# Patient Record
Sex: MALE | URBAN - METROPOLITAN AREA
[De-identification: names, ages, dates, MRNs, and addresses within clinical notes are randomized per-mention and may not be internally consistent; named-entity substitution may affect disease eponyms.]

---

## 2018-02-13 ENCOUNTER — INPATIENT (INPATIENT)
Age: 68
LOS: 6 days | Discharge: HOME | End: 2018-02-20
Attending: INTERNAL MEDICINE | Admitting: INTERNAL MEDICINE
Payer: MEDICARE

## 2018-02-13 VITALS
HEART RATE: 18 BPM | RESPIRATION RATE: 18 BRPM | SYSTOLIC BLOOD PRESSURE: 123 MMHG | DIASTOLIC BLOOD PRESSURE: 62 MMHG | OXYGEN SATURATION: 100 % | TEMPERATURE: 98 F

## 2018-02-13 DIAGNOSIS — R07.9 CHEST PAIN, UNSPECIFIED: ICD-10-CM

## 2018-02-13 LAB
ALBUMIN SERPL ELPH-MCNC: 4.4 G/DL — SIGNIFICANT CHANGE UP (ref 3.3–5)
ALP SERPL-CCNC: 98 U/L — SIGNIFICANT CHANGE UP (ref 40–120)
ALT FLD-CCNC: 11 U/L — SIGNIFICANT CHANGE UP (ref 4–41)
AST SERPL-CCNC: 23 U/L — SIGNIFICANT CHANGE UP (ref 4–40)
BASOPHILS # BLD AUTO: 0.02 K/UL — SIGNIFICANT CHANGE UP (ref 0–0.2)
BASOPHILS NFR BLD AUTO: 0.3 % — SIGNIFICANT CHANGE UP (ref 0–2)
BILIRUB SERPL-MCNC: 0.3 MG/DL — SIGNIFICANT CHANGE UP (ref 0.2–1.2)
BUN SERPL-MCNC: 131 MG/DL — HIGH (ref 7–23)
CALCIUM SERPL-MCNC: 9.5 MG/DL — SIGNIFICANT CHANGE UP (ref 8.4–10.5)
CHLORIDE SERPL-SCNC: 90 MMOL/L — LOW (ref 98–107)
CK MB BLD-MCNC: 2.51 NG/ML — SIGNIFICANT CHANGE UP (ref 1–6.6)
CK MB BLD-MCNC: SIGNIFICANT CHANGE UP (ref 0–2.5)
CK SERPL-CCNC: 102 U/L — SIGNIFICANT CHANGE UP (ref 30–200)
CO2 SERPL-SCNC: 23 MMOL/L — SIGNIFICANT CHANGE UP (ref 22–31)
CREAT SERPL-MCNC: 2.67 MG/DL — HIGH (ref 0.5–1.3)
EOSINOPHIL # BLD AUTO: 0.32 K/UL — SIGNIFICANT CHANGE UP (ref 0–0.5)
EOSINOPHIL NFR BLD AUTO: 4.3 % — SIGNIFICANT CHANGE UP (ref 0–6)
GLUCOSE SERPL-MCNC: 120 MG/DL — HIGH (ref 70–99)
HCT VFR BLD CALC: 28.9 % — LOW (ref 39–50)
HGB BLD-MCNC: 9.7 G/DL — LOW (ref 13–17)
IMM GRANULOCYTES # BLD AUTO: 0.04 # — SIGNIFICANT CHANGE UP
IMM GRANULOCYTES NFR BLD AUTO: 0.5 % — SIGNIFICANT CHANGE UP (ref 0–1.5)
LYMPHOCYTES # BLD AUTO: 0.77 K/UL — LOW (ref 1–3.3)
LYMPHOCYTES # BLD AUTO: 10.4 % — LOW (ref 13–44)
MCHC RBC-ENTMCNC: 32.1 PG — SIGNIFICANT CHANGE UP (ref 27–34)
MCHC RBC-ENTMCNC: 33.6 % — SIGNIFICANT CHANGE UP (ref 32–36)
MCV RBC AUTO: 95.7 FL — SIGNIFICANT CHANGE UP (ref 80–100)
MONOCYTES # BLD AUTO: 0.62 K/UL — SIGNIFICANT CHANGE UP (ref 0–0.9)
MONOCYTES NFR BLD AUTO: 8.3 % — SIGNIFICANT CHANGE UP (ref 2–14)
NEUTROPHILS # BLD AUTO: 5.66 K/UL — SIGNIFICANT CHANGE UP (ref 1.8–7.4)
NEUTROPHILS NFR BLD AUTO: 76.2 % — SIGNIFICANT CHANGE UP (ref 43–77)
NRBC # FLD: 0 — SIGNIFICANT CHANGE UP
PLATELET # BLD AUTO: 182 K/UL — SIGNIFICANT CHANGE UP (ref 150–400)
PMV BLD: 9.9 FL — SIGNIFICANT CHANGE UP (ref 7–13)
POTASSIUM SERPL-MCNC: 4.4 MMOL/L — SIGNIFICANT CHANGE UP (ref 3.5–5.3)
POTASSIUM SERPL-SCNC: 4.4 MMOL/L — SIGNIFICANT CHANGE UP (ref 3.5–5.3)
PROT SERPL-MCNC: 7.9 G/DL — SIGNIFICANT CHANGE UP (ref 6–8.3)
RBC # BLD: 3.02 M/UL — LOW (ref 4.2–5.8)
RBC # FLD: 14.7 % — HIGH (ref 10.3–14.5)
SODIUM SERPL-SCNC: 135 MMOL/L — SIGNIFICANT CHANGE UP (ref 135–145)
TROPONIN T SERPL-MCNC: < 0.06 NG/ML — SIGNIFICANT CHANGE UP (ref 0–0.06)
WBC # BLD: 7.43 K/UL — SIGNIFICANT CHANGE UP (ref 3.8–10.5)
WBC # FLD AUTO: 7.43 K/UL — SIGNIFICANT CHANGE UP (ref 3.8–10.5)

## 2018-02-13 PROCEDURE — 71046 X-RAY EXAM CHEST 2 VIEWS: CPT | Mod: 26

## 2018-02-13 RX ORDER — ASPIRIN/CALCIUM CARB/MAGNESIUM 324 MG
162 TABLET ORAL ONCE
Qty: 0 | Refills: 0 | Status: COMPLETED | OUTPATIENT
Start: 2018-02-13 | End: 2018-02-13

## 2018-02-13 RX ADMIN — Medication 162 MILLIGRAM(S): at 21:03

## 2018-02-13 NOTE — ED ADULT NURSE NOTE - OBJECTIVE STATEMENT
Received pt to spot 4 A&ox4 c/o sent by PCP r/t EKG changes a/w midsternal CP nonradiating and mild lethargy since this am. Pt reports had stent placed 4 yrs ago was on blood thinners, has not been in years, takes daily 81mg ASA. respirations noted to be even and unlabored on assessment, denies other PMH, NAD noted, IV placed, labs sent, VS as documented, will reassess.

## 2018-02-13 NOTE — ED PROVIDER NOTE - ATTENDING CONTRIBUTION TO CARE
DR. GILL, ATTENDING MD-  I performed a face to face bedside interview with patient regarding history of present illness, review of symptoms and past medical history. I completed an independent physical exam.  I have discussed patient's plan of care with the resident.   Documentation as above in the note.    Mode: h/o CAD, CKD, obesity, c/o generalized weakness x several days, followed by left cp since yesterday.  CP dull, 5/10 sev, no exac/allev factors, no radiation, no assoc dyspnea/diaphoresis/nausea.  currently no CP.  On my exam: well appearing comfortable, heart rrr, lungs clear, abd soft, legs no signif ESTEFANI.  a/p: cp, r/o acs

## 2018-02-13 NOTE — ED ADULT NURSE NOTE - CHIEF COMPLAINT QUOTE
pt BIBA to [facility] and tranffered to [facility], pt was seen at the clinic today for chest pain.  pt has PMH: MI with cardiac stents.

## 2018-02-13 NOTE — ED PROVIDER NOTE - OBJECTIVE STATEMENT
67M h/o HTN, HLD, CKD, CAD s/p stent p/w non-exertional, non-radiating L sided CP since yesterday morning, originally intermittent but constant today, went to [facility] ans was sent to ED. Endorses weakness and decreased PO intake x 2-3 days. Denies nausea, vomiting, leg pain, leg swelling, injuries, SOB.

## 2018-02-13 NOTE — ED PROVIDER NOTE - PMH
AAA (abdominal aortic aneurysm)    Chronic back pain    CKD (chronic kidney disease)    COPD bronchitis    Hyperlipidemia    Hypertension    Sleep apnea

## 2018-02-13 NOTE — ED ADULT TRIAGE NOTE - CHIEF COMPLAINT QUOTE
pt BIBA to [facility] and tranffered to [facility], pt was seen at the clinic today for chest pain.  pt has PMH: MI with cardiac stents.

## 2018-02-14 DIAGNOSIS — R60.0 LOCALIZED EDEMA: ICD-10-CM

## 2018-02-14 DIAGNOSIS — N18.4 CHRONIC KIDNEY DISEASE, STAGE 4 (SEVERE): ICD-10-CM

## 2018-02-14 DIAGNOSIS — R07.9 CHEST PAIN, UNSPECIFIED: ICD-10-CM

## 2018-02-14 DIAGNOSIS — E78.5 HYPERLIPIDEMIA, UNSPECIFIED: ICD-10-CM

## 2018-02-14 DIAGNOSIS — N17.9 ACUTE KIDNEY FAILURE, UNSPECIFIED: ICD-10-CM

## 2018-02-14 DIAGNOSIS — I12.9 HYPERTENSIVE CHRONIC KIDNEY DISEASE WITH STAGE 1 THROUGH STAGE 4 CHRONIC KIDNEY DISEASE, OR UNSPECIFIED CHRONIC KIDNEY DISEASE: ICD-10-CM

## 2018-02-14 DIAGNOSIS — I10 ESSENTIAL (PRIMARY) HYPERTENSION: ICD-10-CM

## 2018-02-14 DIAGNOSIS — N25.81 SECONDARY HYPERPARATHYROIDISM OF RENAL ORIGIN: ICD-10-CM

## 2018-02-14 DIAGNOSIS — D63.1 ANEMIA IN CHRONIC KIDNEY DISEASE: ICD-10-CM

## 2018-02-14 DIAGNOSIS — M54.9 DORSALGIA, UNSPECIFIED: ICD-10-CM

## 2018-02-14 DIAGNOSIS — Z29.9 ENCOUNTER FOR PROPHYLACTIC MEASURES, UNSPECIFIED: ICD-10-CM

## 2018-02-14 DIAGNOSIS — N18.9 CHRONIC KIDNEY DISEASE, UNSPECIFIED: ICD-10-CM

## 2018-02-14 LAB
BASOPHILS # BLD AUTO: 0.02 K/UL — SIGNIFICANT CHANGE UP (ref 0–0.2)
BASOPHILS NFR BLD AUTO: 0.3 % — SIGNIFICANT CHANGE UP (ref 0–2)
BUN SERPL-MCNC: 133 MG/DL — HIGH (ref 7–23)
CALCIUM SERPL-MCNC: 9.6 MG/DL — SIGNIFICANT CHANGE UP (ref 8.4–10.5)
CHLORIDE SERPL-SCNC: 91 MMOL/L — LOW (ref 98–107)
CHOLEST SERPL-MCNC: 96 MG/DL — LOW (ref 120–199)
CK MB BLD-MCNC: 2.22 NG/ML — SIGNIFICANT CHANGE UP (ref 1–6.6)
CK MB BLD-MCNC: 2.45 NG/ML — SIGNIFICANT CHANGE UP (ref 1–6.6)
CK SERPL-CCNC: 58 U/L — SIGNIFICANT CHANGE UP (ref 30–200)
CK SERPL-CCNC: 66 U/L — SIGNIFICANT CHANGE UP (ref 30–200)
CO2 SERPL-SCNC: 24 MMOL/L — SIGNIFICANT CHANGE UP (ref 22–31)
CREAT SERPL-MCNC: 2.47 MG/DL — HIGH (ref 0.5–1.3)
EOSINOPHIL # BLD AUTO: 0.32 K/UL — SIGNIFICANT CHANGE UP (ref 0–0.5)
EOSINOPHIL NFR BLD AUTO: 5.1 % — SIGNIFICANT CHANGE UP (ref 0–6)
GLUCOSE SERPL-MCNC: 93 MG/DL — SIGNIFICANT CHANGE UP (ref 70–99)
HBA1C BLD-MCNC: 5.3 % — SIGNIFICANT CHANGE UP (ref 4–5.6)
HCT VFR BLD CALC: 27.6 % — LOW (ref 39–50)
HDLC SERPL-MCNC: 29 MG/DL — LOW (ref 35–55)
HGB BLD-MCNC: 9.3 G/DL — LOW (ref 13–17)
IMM GRANULOCYTES # BLD AUTO: 0.03 # — SIGNIFICANT CHANGE UP
IMM GRANULOCYTES NFR BLD AUTO: 0.5 % — SIGNIFICANT CHANGE UP (ref 0–1.5)
LIPID PNL WITH DIRECT LDL SERPL: 51 MG/DL — SIGNIFICANT CHANGE UP
LYMPHOCYTES # BLD AUTO: 0.84 K/UL — LOW (ref 1–3.3)
LYMPHOCYTES # BLD AUTO: 13.3 % — SIGNIFICANT CHANGE UP (ref 13–44)
MAGNESIUM SERPL-MCNC: 1.8 MG/DL — SIGNIFICANT CHANGE UP (ref 1.6–2.6)
MCHC RBC-ENTMCNC: 32.3 PG — SIGNIFICANT CHANGE UP (ref 27–34)
MCHC RBC-ENTMCNC: 33.7 % — SIGNIFICANT CHANGE UP (ref 32–36)
MCV RBC AUTO: 95.8 FL — SIGNIFICANT CHANGE UP (ref 80–100)
MONOCYTES # BLD AUTO: 0.57 K/UL — SIGNIFICANT CHANGE UP (ref 0–0.9)
MONOCYTES NFR BLD AUTO: 9 % — SIGNIFICANT CHANGE UP (ref 2–14)
NEUTROPHILS # BLD AUTO: 4.53 K/UL — SIGNIFICANT CHANGE UP (ref 1.8–7.4)
NEUTROPHILS NFR BLD AUTO: 71.8 % — SIGNIFICANT CHANGE UP (ref 43–77)
NRBC # FLD: 0 — SIGNIFICANT CHANGE UP
PHOSPHATE SERPL-MCNC: 4.6 MG/DL — HIGH (ref 2.5–4.5)
PLATELET # BLD AUTO: 195 K/UL — SIGNIFICANT CHANGE UP (ref 150–400)
PMV BLD: 10.4 FL — SIGNIFICANT CHANGE UP (ref 7–13)
POTASSIUM SERPL-MCNC: 3.1 MMOL/L — LOW (ref 3.5–5.3)
POTASSIUM SERPL-SCNC: 3.1 MMOL/L — LOW (ref 3.5–5.3)
RBC # BLD: 2.88 M/UL — LOW (ref 4.2–5.8)
RBC # FLD: 14.6 % — HIGH (ref 10.3–14.5)
SODIUM SERPL-SCNC: 139 MMOL/L — SIGNIFICANT CHANGE UP (ref 135–145)
TRIGL SERPL-MCNC: 133 MG/DL — SIGNIFICANT CHANGE UP (ref 10–149)
TROPONIN T SERPL-MCNC: 0.06 NG/ML — SIGNIFICANT CHANGE UP (ref 0–0.06)
TROPONIN T SERPL-MCNC: 0.07 NG/ML — HIGH (ref 0–0.06)
TSH SERPL-MCNC: 2.6 UIU/ML — SIGNIFICANT CHANGE UP (ref 0.27–4.2)
WBC # BLD: 6.31 K/UL — SIGNIFICANT CHANGE UP (ref 3.8–10.5)
WBC # FLD AUTO: 6.31 K/UL — SIGNIFICANT CHANGE UP (ref 3.8–10.5)

## 2018-02-14 PROCEDURE — 78452 HT MUSCLE IMAGE SPECT MULT: CPT | Mod: 26

## 2018-02-14 PROCEDURE — 93306 TTE W/DOPPLER COMPLETE: CPT | Mod: 26

## 2018-02-14 PROCEDURE — 93016 CV STRESS TEST SUPVJ ONLY: CPT | Mod: GC

## 2018-02-14 PROCEDURE — 93018 CV STRESS TEST I&R ONLY: CPT | Mod: GC

## 2018-02-14 RX ORDER — FUROSEMIDE 40 MG
40 TABLET ORAL EVERY 12 HOURS
Qty: 0 | Refills: 0 | Status: DISCONTINUED | OUTPATIENT
Start: 2018-02-15 | End: 2018-02-15

## 2018-02-14 RX ORDER — HEPARIN SODIUM 5000 [USP'U]/ML
5000 INJECTION INTRAVENOUS; SUBCUTANEOUS EVERY 12 HOURS
Qty: 0 | Refills: 0 | Status: DISCONTINUED | OUTPATIENT
Start: 2018-02-14 | End: 2018-02-20

## 2018-02-14 RX ORDER — GEMFIBROZIL 600 MG
300 TABLET ORAL
Qty: 0 | Refills: 0 | Status: DISCONTINUED | OUTPATIENT
Start: 2018-02-14 | End: 2018-02-20

## 2018-02-14 RX ORDER — LOSARTAN POTASSIUM 100 MG/1
25 TABLET, FILM COATED ORAL DAILY
Qty: 0 | Refills: 0 | Status: DISCONTINUED | OUTPATIENT
Start: 2018-02-14 | End: 2018-02-15

## 2018-02-14 RX ORDER — PANTOPRAZOLE SODIUM 20 MG/1
1 TABLET, DELAYED RELEASE ORAL
Qty: 0 | Refills: 0 | COMMUNITY

## 2018-02-14 RX ORDER — FUROSEMIDE 40 MG
40 TABLET ORAL AT BEDTIME
Qty: 0 | Refills: 0 | Status: DISCONTINUED | OUTPATIENT
Start: 2018-02-14 | End: 2018-02-14

## 2018-02-14 RX ORDER — IPRATROPIUM/ALBUTEROL SULFATE 18-103MCG
1 AEROSOL WITH ADAPTER (GRAM) INHALATION
Qty: 0 | Refills: 0 | COMMUNITY

## 2018-02-14 RX ORDER — PANTOPRAZOLE SODIUM 20 MG/1
40 TABLET, DELAYED RELEASE ORAL
Qty: 0 | Refills: 0 | Status: DISCONTINUED | OUTPATIENT
Start: 2018-02-14 | End: 2018-02-20

## 2018-02-14 RX ORDER — CHOLECALCIFEROL (VITAMIN D3) 125 MCG
2000 CAPSULE ORAL DAILY
Qty: 0 | Refills: 0 | Status: DISCONTINUED | OUTPATIENT
Start: 2018-02-14 | End: 2018-02-20

## 2018-02-14 RX ORDER — METHADONE HYDROCHLORIDE 40 MG/1
10 TABLET ORAL
Qty: 0 | Refills: 0 | Status: DISCONTINUED | OUTPATIENT
Start: 2018-02-14 | End: 2018-02-20

## 2018-02-14 RX ORDER — SUCRALFATE 1 G
1 TABLET ORAL ONCE
Qty: 0 | Refills: 0 | Status: COMPLETED | OUTPATIENT
Start: 2018-02-14 | End: 2018-02-14

## 2018-02-14 RX ORDER — CALCIUM ACETATE 667 MG
667 TABLET ORAL
Qty: 0 | Refills: 0 | Status: DISCONTINUED | OUTPATIENT
Start: 2018-02-14 | End: 2018-02-15

## 2018-02-14 RX ORDER — ATORVASTATIN CALCIUM 80 MG/1
20 TABLET, FILM COATED ORAL AT BEDTIME
Qty: 0 | Refills: 0 | Status: DISCONTINUED | OUTPATIENT
Start: 2018-02-14 | End: 2018-02-20

## 2018-02-14 RX ORDER — ALLOPURINOL 300 MG
1 TABLET ORAL
Qty: 0 | Refills: 0 | COMMUNITY

## 2018-02-14 RX ORDER — MAGNESIUM OXIDE 400 MG ORAL TABLET 241.3 MG
400 TABLET ORAL
Qty: 0 | Refills: 0 | Status: DISCONTINUED | OUTPATIENT
Start: 2018-02-14 | End: 2018-02-15

## 2018-02-14 RX ORDER — ASPIRIN/CALCIUM CARB/MAGNESIUM 324 MG
81 TABLET ORAL DAILY
Qty: 0 | Refills: 0 | Status: DISCONTINUED | OUTPATIENT
Start: 2018-02-14 | End: 2018-02-20

## 2018-02-14 RX ORDER — CARVEDILOL PHOSPHATE 80 MG/1
1 CAPSULE, EXTENDED RELEASE ORAL
Qty: 0 | Refills: 0 | COMMUNITY

## 2018-02-14 RX ORDER — FUROSEMIDE 40 MG
60 TABLET ORAL DAILY
Qty: 0 | Refills: 0 | Status: DISCONTINUED | OUTPATIENT
Start: 2018-02-14 | End: 2018-02-14

## 2018-02-14 RX ORDER — CALCITRIOL 0.5 UG/1
1 CAPSULE ORAL
Qty: 0 | Refills: 0 | COMMUNITY

## 2018-02-14 RX ORDER — FOLIC ACID 0.8 MG
1 TABLET ORAL
Qty: 0 | Refills: 0 | COMMUNITY

## 2018-02-14 RX ORDER — TIOTROPIUM BROMIDE 18 UG/1
1 CAPSULE ORAL; RESPIRATORY (INHALATION) DAILY
Qty: 0 | Refills: 0 | Status: DISCONTINUED | OUTPATIENT
Start: 2018-02-14 | End: 2018-02-20

## 2018-02-14 RX ORDER — CARVEDILOL PHOSPHATE 80 MG/1
12.5 CAPSULE, EXTENDED RELEASE ORAL EVERY 12 HOURS
Qty: 0 | Refills: 0 | Status: DISCONTINUED | OUTPATIENT
Start: 2018-02-14 | End: 2018-02-20

## 2018-02-14 RX ORDER — GEMFIBROZIL 600 MG
450 TABLET ORAL
Qty: 0 | Refills: 0 | Status: DISCONTINUED | OUTPATIENT
Start: 2018-02-14 | End: 2018-02-14

## 2018-02-14 RX ORDER — POTASSIUM CHLORIDE 20 MEQ
40 PACKET (EA) ORAL EVERY 4 HOURS
Qty: 0 | Refills: 0 | Status: COMPLETED | OUTPATIENT
Start: 2018-02-14 | End: 2018-02-14

## 2018-02-14 RX ORDER — CALCITRIOL 0.5 UG/1
0.5 CAPSULE ORAL DAILY
Qty: 0 | Refills: 0 | Status: DISCONTINUED | OUTPATIENT
Start: 2018-02-14 | End: 2018-02-20

## 2018-02-14 RX ORDER — CITALOPRAM 10 MG/1
1 TABLET, FILM COATED ORAL
Qty: 0 | Refills: 0 | COMMUNITY

## 2018-02-14 RX ORDER — ASPIRIN/CALCIUM CARB/MAGNESIUM 324 MG
1 TABLET ORAL
Qty: 0 | Refills: 0 | COMMUNITY

## 2018-02-14 RX ORDER — INFLUENZA VIRUS VACCINE 15; 15; 15; 15 UG/.5ML; UG/.5ML; UG/.5ML; UG/.5ML
0.5 SUSPENSION INTRAMUSCULAR ONCE
Qty: 0 | Refills: 0 | Status: COMPLETED | OUTPATIENT
Start: 2018-02-14 | End: 2018-02-14

## 2018-02-14 RX ORDER — FOLIC ACID 0.8 MG
0.4 TABLET ORAL DAILY
Qty: 0 | Refills: 0 | Status: DISCONTINUED | OUTPATIENT
Start: 2018-02-14 | End: 2018-02-20

## 2018-02-14 RX ORDER — ATORVASTATIN CALCIUM 80 MG/1
1 TABLET, FILM COATED ORAL
Qty: 0 | Refills: 0 | COMMUNITY

## 2018-02-14 RX ORDER — DOCUSATE SODIUM 100 MG
100 CAPSULE ORAL
Qty: 0 | Refills: 0 | Status: DISCONTINUED | OUTPATIENT
Start: 2018-02-14 | End: 2018-02-20

## 2018-02-14 RX ORDER — DOCUSATE SODIUM 100 MG
1 CAPSULE ORAL
Qty: 0 | Refills: 0 | COMMUNITY

## 2018-02-14 RX ORDER — METHADONE HYDROCHLORIDE 40 MG/1
1 TABLET ORAL
Qty: 0 | Refills: 0 | COMMUNITY

## 2018-02-14 RX ORDER — GEMFIBROZIL 600 MG
600 TABLET ORAL
Qty: 0 | Refills: 0 | Status: DISCONTINUED | OUTPATIENT
Start: 2018-02-14 | End: 2018-02-14

## 2018-02-14 RX ORDER — OXYCODONE AND ACETAMINOPHEN 5; 325 MG/1; MG/1
2 TABLET ORAL EVERY 6 HOURS
Qty: 0 | Refills: 0 | Status: DISCONTINUED | OUTPATIENT
Start: 2018-02-14 | End: 2018-02-20

## 2018-02-14 RX ORDER — ALBUTEROL 90 UG/1
2 AEROSOL, METERED ORAL EVERY 6 HOURS
Qty: 0 | Refills: 0 | Status: DISCONTINUED | OUTPATIENT
Start: 2018-02-14 | End: 2018-02-15

## 2018-02-14 RX ORDER — ALLOPURINOL 300 MG
100 TABLET ORAL
Qty: 0 | Refills: 0 | Status: DISCONTINUED | OUTPATIENT
Start: 2018-02-14 | End: 2018-02-20

## 2018-02-14 RX ORDER — CHOLECALCIFEROL (VITAMIN D3) 125 MCG
2 CAPSULE ORAL
Qty: 0 | Refills: 0 | COMMUNITY

## 2018-02-14 RX ORDER — SIMETHICONE 80 MG/1
80 TABLET, CHEWABLE ORAL
Qty: 0 | Refills: 0 | Status: DISCONTINUED | OUTPATIENT
Start: 2018-02-14 | End: 2018-02-20

## 2018-02-14 RX ORDER — CITALOPRAM 10 MG/1
40 TABLET, FILM COATED ORAL DAILY
Qty: 0 | Refills: 0 | Status: DISCONTINUED | OUTPATIENT
Start: 2018-02-14 | End: 2018-02-20

## 2018-02-14 RX ADMIN — CARVEDILOL PHOSPHATE 12.5 MILLIGRAM(S): 80 CAPSULE, EXTENDED RELEASE ORAL at 17:17

## 2018-02-14 RX ADMIN — Medication 600 MILLIGRAM(S): at 05:13

## 2018-02-14 RX ADMIN — Medication 667 MILLIGRAM(S): at 09:33

## 2018-02-14 RX ADMIN — PANTOPRAZOLE SODIUM 40 MILLIGRAM(S): 20 TABLET, DELAYED RELEASE ORAL at 05:13

## 2018-02-14 RX ADMIN — CITALOPRAM 40 MILLIGRAM(S): 10 TABLET, FILM COATED ORAL at 12:14

## 2018-02-14 RX ADMIN — Medication 667 MILLIGRAM(S): at 12:14

## 2018-02-14 RX ADMIN — Medication 75 MILLIGRAM(S): at 12:09

## 2018-02-14 RX ADMIN — Medication 100 MILLIGRAM(S): at 18:20

## 2018-02-14 RX ADMIN — LOSARTAN POTASSIUM 25 MILLIGRAM(S): 100 TABLET, FILM COATED ORAL at 05:11

## 2018-02-14 RX ADMIN — ATORVASTATIN CALCIUM 20 MILLIGRAM(S): 80 TABLET, FILM COATED ORAL at 21:21

## 2018-02-14 RX ADMIN — CALCITRIOL 0.5 MICROGRAM(S): 0.5 CAPSULE ORAL at 12:13

## 2018-02-14 RX ADMIN — Medication 100 MILLIGRAM(S): at 09:34

## 2018-02-14 RX ADMIN — MAGNESIUM OXIDE 400 MG ORAL TABLET 400 MILLIGRAM(S): 241.3 TABLET ORAL at 09:43

## 2018-02-14 RX ADMIN — Medication 40 MILLIEQUIVALENT(S): at 17:16

## 2018-02-14 RX ADMIN — MAGNESIUM OXIDE 400 MG ORAL TABLET 400 MILLIGRAM(S): 241.3 TABLET ORAL at 17:18

## 2018-02-14 RX ADMIN — Medication 2000 UNIT(S): at 12:14

## 2018-02-14 RX ADMIN — Medication 100 MILLIGRAM(S): at 05:10

## 2018-02-14 RX ADMIN — ALBUTEROL 2 PUFF(S): 90 AEROSOL, METERED ORAL at 05:13

## 2018-02-14 RX ADMIN — TIOTROPIUM BROMIDE 1 CAPSULE(S): 18 CAPSULE ORAL; RESPIRATORY (INHALATION) at 09:35

## 2018-02-14 RX ADMIN — Medication 667 MILLIGRAM(S): at 17:18

## 2018-02-14 RX ADMIN — PANTOPRAZOLE SODIUM 40 MILLIGRAM(S): 20 TABLET, DELAYED RELEASE ORAL at 17:18

## 2018-02-14 RX ADMIN — Medication 100 MILLIGRAM(S): at 17:18

## 2018-02-14 RX ADMIN — Medication 0.4 MILLIGRAM(S): at 12:15

## 2018-02-14 RX ADMIN — Medication 81 MILLIGRAM(S): at 12:13

## 2018-02-14 RX ADMIN — Medication 60 MILLIGRAM(S): at 05:10

## 2018-02-14 RX ADMIN — SIMETHICONE 80 MILLIGRAM(S): 80 TABLET, CHEWABLE ORAL at 20:06

## 2018-02-14 RX ADMIN — Medication 1 GRAM(S): at 22:33

## 2018-02-14 RX ADMIN — ALBUTEROL 2 PUFF(S): 90 AEROSOL, METERED ORAL at 21:22

## 2018-02-14 RX ADMIN — HEPARIN SODIUM 5000 UNIT(S): 5000 INJECTION INTRAVENOUS; SUBCUTANEOUS at 17:17

## 2018-02-14 RX ADMIN — ALBUTEROL 2 PUFF(S): 90 AEROSOL, METERED ORAL at 09:34

## 2018-02-14 RX ADMIN — HEPARIN SODIUM 5000 UNIT(S): 5000 INJECTION INTRAVENOUS; SUBCUTANEOUS at 05:10

## 2018-02-14 RX ADMIN — ALBUTEROL 2 PUFF(S): 90 AEROSOL, METERED ORAL at 17:19

## 2018-02-14 RX ADMIN — Medication 40 MILLIEQUIVALENT(S): at 12:09

## 2018-02-14 NOTE — H&P ADULT - HISTORY OF PRESENT ILLNESS
68 y/o M with h/o HTN, HLD, CKD stage IV, CAD s/p 1 stent, AAA repair, COPD presents to the ED for chest pain X 1 day. Pt states he has had left sided constant chest pain with no radiation, nonexertional, nonpleuritic. Pt states the chest pain has currently resolved. Pt also states he suffers from chronic back pain since 1971 for years now. Pt denies LOC, syncope, fever, chills, palpitations shortness of breath, N/V/D/C, tingling, dysuria, urinary/bowel incontinence or any other complaints at this time.

## 2018-02-14 NOTE — CONSULT NOTE ADULT - PROBLEM SELECTOR RECOMMENDATION 5
Ok to continue with calcitriol and phoslo but check iPTH, phosphorus and vitamin D 25-OH level. Change diet to low phosphorus. Ok to continue with calcitriol, cholecalciferol and phoslo but check iPTH, phosphorus and vitamin D 25-OH level. Change diet to low phosphorus.

## 2018-02-14 NOTE — CONSULT NOTE ADULT - PROBLEM SELECTOR RECOMMENDATION 9
Patient with MATT suspect hemodynamically mediated due to diuretic therapy as patient appears dry. Will decrease diuretics as below. Ok to continue with losartan for now but will discontinue if Scr continues to increase. Avoid nephrotoxins.

## 2018-02-14 NOTE — H&P ADULT - ASSESSMENT
66 y/o M with h/o HTN, HLD, CKD stage IV, CAD s/p 1 stent, AAA repair, COPD presents to the ED for chest pain X 1 day.Admit to telemetry.

## 2018-02-14 NOTE — CONSULT NOTE ADULT - SUBJECTIVE AND OBJECTIVE BOX
[facility]- CONSULTATION NOTE    67 year old male with history of below presents with CP. Nephrology consulted for elevated Scr.    Patient states he has known history of CKD-4 thought to be from cardiorenal syndrome. Patient follows with outpatient nephrologist (name unknown) at [facility] for the past 4 years. Patient with h/o MATT but denies any prior history of requiring dialysis or having a kidney biopsy. Baseline Scr 2.3 as per patient.    Patient has h/o HTN for approximately 10 years. Patient denies any h/o DM. Patient with h/o kidney stones (unknown composition) however without stones for the past 10 years. Patient denies any chronic NSAID use or family h/o kidney disease.    REVIEW OF SYSTEMS:  Gen: no changes in weight, + fatigue  HEENT: no rhinorrhea  Neck: no sore throat  Cards: + chest pain resolved  Resp: no dyspnea  GI: no nausea or vomiting or diarrhea  : no dysuria or hematuria  Vascular: no LE edema  Derm: no rashes  Neuro: no numbness/tingling    No Known Allergies      Home Medications Reviewed  Hospital Medications:   MEDICATIONS  (STANDING):  ALBUTerol    90 MICROgram(s) HFA Inhaler 2 Puff(s) Inhalation every 6 hours  allopurinol 100 milliGRAM(s) Oral two times a day after meals  aspirin  chewable 81 milliGRAM(s) Oral daily  atorvastatin 20 milliGRAM(s) Oral at bedtime  calcitriol   Capsule 0.5 MICROGram(s) Oral daily  calcium acetate 667 milliGRAM(s) Oral three times a day with meals  carvedilol 12.5 milliGRAM(s) Oral every 12 hours  cholecalciferol 2000 Unit(s) Oral daily  citalopram 40 milliGRAM(s) Oral daily  docusate sodium 100 milliGRAM(s) Oral two times a day  folic acid 0.4 milliGRAM(s) Oral daily  furosemide    Tablet 60 milliGRAM(s) Oral daily  furosemide    Tablet 40 milliGRAM(s) Oral at bedtime  gemfibrozil 600 milliGRAM(s) Oral two times a day before meals  heparin  Injectable 5000 Unit(s) SubCutaneous every 12 hours  losartan 25 milliGRAM(s) Oral daily  magnesium oxide 400 milliGRAM(s) Oral two times a day with meals  metolazone 5 milliGRAM(s) Oral daily  pantoprazole    Tablet 40 milliGRAM(s) Oral two times a day before meals  pregabalin 75 milliGRAM(s) Oral daily  tiotropium 18 MICROgram(s) Capsule 1 Capsule(s) Inhalation daily      PAST MEDICAL & SURGICAL HISTORY:  Sleep apnea  Chronic back pain  CKD (chronic kidney disease)  AAA (abdominal aortic aneurysm)  COPD bronchitis  Hyperlipidemia  Hypertension  Hernia  Gastric bypass status for obesity  Abdominal aortic aneurysm      FAMILY HISTORY:  No pertinent family history in first degree relatives      SOCIAL HISTORY:  Denies toxic substance use     VITALS:  T(F): 98.1 (02-14-18 @ 05:05), Max: 98.1 (02-13-18 @ 23:51)  HR: 56 (02-14-18 @ 05:05)  BP: 117/57 (02-14-18 @ 05:05)  RR: 12 (02-14-18 @ 05:05)  SpO2: 97% (02-14-18 @ 05:05)  Wt(kg): --    02-13 @ 07:01  -  02-14 @ 07:00  --------------------------------------------------------  IN: 180 mL / OUT: 850 mL / NET: -670 mL      Height (cm): 172.72 (02-13 @ 23:51)  Weight (kg): 99 (02-13 @ 23:51)  BMI (kg/m2): 33.2 (02-13 @ 23:51)  BSA (m2): 2.12 (02-13 @ 23:51)    PHYSICAL EXAM:  Gen: NAD, calm  HEENT: MMM  Neck: no JVD  Cards: RRR, +S1/S2, no M/G/R  Resp: CTA B/L  GI: soft, NT/ND, NABS  : no CVA tenderness  Vascular: no LE edema B/L  Derm: no rashes  Neuro: non-focal    LABS:  02-13    135  |  90<L>  |  131<H>  ----------------------------<  120<H>  4.4   |  23  |  2.67<H>    Ca    9.5      13 Feb 2018 20:05    TPro  7.9  /  Alb  4.4  /  TBili  0.3  /  DBili      /  AST  23  /  ALT  11  /  AlkPhos  98  02-13    Creatinine Trend: 2.67 <--                        9.7    7.43  )-----------( 182      ( 13 Feb 2018 20:05 )             28.9     Urine Studies:        RADIOLOGY & ADDITIONAL STUDIES:  < from: Xray Chest 2 Views PA/Lat (02.13.18 @ 21:34) >  IMPRESSION:   No acute pulmonary disease.    < end of copied text >

## 2018-02-14 NOTE — H&P ADULT - NSHPPHYSICALEXAM_GEN_ALL_CORE
GENERAL APPEARANCE: Well developed, well nourished, alert and cooperative, and appears to be in no acute distress.  HEAD: normocephalic.  EYES: PERRL, EOMI.   EARS: External auditory canals clear, hearing grossly intact.  NECK: Neck supple, non-tender without lymphadenopathy, masses or thyromegaly.  CARDIAC: Normal S1 and S2. No S3, S4 or murmurs. Rhythm is regular.   LUNGS: Clear to auscultation and percussion without rales, rhonchi, wheezing or diminished breath sounds.  ABDOMEN: Positive bowel sounds. Soft, nondistended, nontender. + ventral hernia. No guarding or rebound. No masses.  EXTREMITIES: No significant deformity or joint abnormality. No edema. Peripheral pulses intact.   NEUROLOGICAL: Strength and sensation symmetric and intact throughout.   SKIN: Skin normal color, texture and turgor with no lesions or eruptions.  PSYCHIATRIC: The mental examination revealed the patient was oriented to person, place, and time. The patient was able to demonstrate good judgement and reason, without hallucinations, abnormal affect or abnormal behaviors during the examination. Patient is not suicidal.

## 2018-02-14 NOTE — H&P ADULT - PROBLEM SELECTOR PLAN 1
Admit to telemetry.   Trend CE, Pending NST vs. cardiac cath.   TTE ordered.   EKG PRN chest pain.   check cbc,tsh,lipid, hemoglobin a1c, bmp with mag and phos.  f/u MD note

## 2018-02-14 NOTE — CONSULT NOTE ADULT - ATTENDING COMMENTS
[facility]  Nahid Rosen M.D.  Gianni Wood D.O.  Karen Ceja M.D.  Jacinda Rubin, MSN, ANP-C    Telephone: [phone]  Facsimile: [phone]    [address] [place] [address]

## 2018-02-14 NOTE — H&P ADULT - NSHPLABSRESULTS_GEN_ALL_CORE
LABS:                        9.7    7.43  )-----------( 182      ( 13 Feb 2018 20:05 )             28.9     02-13    135  |  90<L>  |  131<H>  ----------------------------<  120<H>  4.4   |  23  |  2.67<H>    Ca    9.5      13 Feb 2018 20:05    TPro  7.9  /  Alb  4.4  /  TBili  0.3  /  DBili  x   /  AST  23  /  ALT  11  /  AlkPhos  98  02-13        CAPILLARY BLOOD GLUCOSE    EKG shows NSR with PVC @ 60 bpm T wave flattening in V1, I  ms

## 2018-02-14 NOTE — CONSULT NOTE ADULT - PROBLEM SELECTOR RECOMMENDATION 4
Patient appears dry. Will hold evening lasix and resume tomorrow. Given significant azotemia and complaints of fatigue, will decrease metolazone to 5 mg every other day. Check serum magnesium level as patient on standing magnesium. Monitor UO.

## 2018-02-14 NOTE — CONSULT NOTE ADULT - PROBLEM SELECTOR RECOMMENDATION 2
Patient with known CKD-4 (baseline 2.3 as per patient) thought to be due to cardiorenal syndrome and HTN. Defer inpatient CKD work up as patient follow with outpatient nephrologist. Monitor electrolytes. Patient with known CKD-4 (baseline 2.3 as per patient) thought to be due to cardiorenal syndrome and HTN. Defer inpatient CKD work up as patient follow with outpatient nephrologist. Would decrease lopid to 450 mg twice daily if ok with cardiology given renal failure. Monitor electrolytes.

## 2018-02-14 NOTE — H&P ADULT - ATTENDING COMMENTS
Patient seen and examined, agree with the above assessment and plan by PA above  Pt presenting with chest pain with history of CAD s/p PCI  Recommend Nuclear stress  Renal eval  ECHO

## 2018-02-15 LAB
24R-OH-CALCIDIOL SERPL-MCNC: 23.5 NG/ML — LOW (ref 30–80)
ALBUMIN SERPL ELPH-MCNC: 4.4 G/DL — SIGNIFICANT CHANGE UP (ref 3.3–5)
ALP SERPL-CCNC: 101 U/L — SIGNIFICANT CHANGE UP (ref 40–120)
ALT FLD-CCNC: 6 U/L — SIGNIFICANT CHANGE UP (ref 4–41)
AMYLASE P1 CFR SERPL: 63 U/L — SIGNIFICANT CHANGE UP (ref 25–125)
APPEARANCE UR: CLEAR — SIGNIFICANT CHANGE UP
AST SERPL-CCNC: 16 U/L — SIGNIFICANT CHANGE UP (ref 4–40)
BACTERIA # UR AUTO: SIGNIFICANT CHANGE UP
BASOPHILS # BLD AUTO: 0.02 K/UL — SIGNIFICANT CHANGE UP (ref 0–0.2)
BASOPHILS NFR BLD AUTO: 0.1 % — SIGNIFICANT CHANGE UP (ref 0–2)
BILIRUB DIRECT SERPL-MCNC: 0.1 MG/DL — SIGNIFICANT CHANGE UP (ref 0.1–0.2)
BILIRUB SERPL-MCNC: 0.4 MG/DL — SIGNIFICANT CHANGE UP (ref 0.2–1.2)
BILIRUB UR-MCNC: NEGATIVE — SIGNIFICANT CHANGE UP
BLOOD UR QL VISUAL: NEGATIVE — SIGNIFICANT CHANGE UP
BUN SERPL-MCNC: 131 MG/DL — HIGH (ref 7–23)
BUN SERPL-MCNC: 131 MG/DL — HIGH (ref 7–23)
C DIFF TOX GENS STL QL NAA+PROBE: SIGNIFICANT CHANGE UP
CALCIUM SERPL-MCNC: 9.8 MG/DL — SIGNIFICANT CHANGE UP (ref 8.4–10.5)
CALCIUM SERPL-MCNC: 9.8 MG/DL — SIGNIFICANT CHANGE UP (ref 8.4–10.5)
CHLORIDE SERPL-SCNC: 92 MMOL/L — LOW (ref 98–107)
CHLORIDE SERPL-SCNC: 92 MMOL/L — LOW (ref 98–107)
CK MB BLD-MCNC: 1.82 NG/ML — SIGNIFICANT CHANGE UP (ref 1–6.6)
CK MB BLD-MCNC: SIGNIFICANT CHANGE UP (ref 0–2.5)
CK SERPL-CCNC: 59 U/L — SIGNIFICANT CHANGE UP (ref 30–200)
CO2 SERPL-SCNC: 24 MMOL/L — SIGNIFICANT CHANGE UP (ref 22–31)
CO2 SERPL-SCNC: 24 MMOL/L — SIGNIFICANT CHANGE UP (ref 22–31)
COLOR SPEC: YELLOW — SIGNIFICANT CHANGE UP
CREAT ?TM UR-MCNC: 134.13 MG/DL — SIGNIFICANT CHANGE UP
CREAT SERPL-MCNC: 3 MG/DL — HIGH (ref 0.5–1.3)
CREAT SERPL-MCNC: 3 MG/DL — HIGH (ref 0.5–1.3)
EOSINOPHIL # BLD AUTO: 0.08 K/UL — SIGNIFICANT CHANGE UP (ref 0–0.5)
EOSINOPHIL NFR BLD AUTO: 0.5 % — SIGNIFICANT CHANGE UP (ref 0–6)
FERRITIN SERPL-MCNC: 229.4 NG/ML — SIGNIFICANT CHANGE UP (ref 30–400)
GGT SERPL-CCNC: 8 U/L — SIGNIFICANT CHANGE UP (ref 8–61)
GLUCOSE SERPL-MCNC: 112 MG/DL — HIGH (ref 70–99)
GLUCOSE SERPL-MCNC: 112 MG/DL — HIGH (ref 70–99)
GLUCOSE UR-MCNC: NEGATIVE — SIGNIFICANT CHANGE UP
HCT VFR BLD CALC: 28.7 % — LOW (ref 39–50)
HGB BLD-MCNC: 9.8 G/DL — LOW (ref 13–17)
HYALINE CASTS # UR AUTO: SIGNIFICANT CHANGE UP (ref 0–?)
IMM GRANULOCYTES # BLD AUTO: 0.07 # — SIGNIFICANT CHANGE UP
IMM GRANULOCYTES NFR BLD AUTO: 0.4 % — SIGNIFICANT CHANGE UP (ref 0–1.5)
IRON SATN MFR SERPL: 32 UG/DL — LOW (ref 45–165)
IRON SATN MFR SERPL: 376 UG/DL — SIGNIFICANT CHANGE UP (ref 155–535)
KETONES UR-MCNC: NEGATIVE — SIGNIFICANT CHANGE UP
LDH SERPL L TO P-CCNC: 158 U/L — SIGNIFICANT CHANGE UP (ref 135–225)
LEUKOCYTE ESTERASE UR-ACNC: NEGATIVE — SIGNIFICANT CHANGE UP
LIDOCAIN IGE QN: 22.9 U/L — SIGNIFICANT CHANGE UP (ref 7–60)
LYMPHOCYTES # BLD AUTO: 0.82 K/UL — LOW (ref 1–3.3)
LYMPHOCYTES # BLD AUTO: 5.1 % — LOW (ref 13–44)
MAGNESIUM SERPL-MCNC: 1.8 MG/DL — SIGNIFICANT CHANGE UP (ref 1.6–2.6)
MCHC RBC-ENTMCNC: 33.4 PG — SIGNIFICANT CHANGE UP (ref 27–34)
MCHC RBC-ENTMCNC: 34.1 % — SIGNIFICANT CHANGE UP (ref 32–36)
MCV RBC AUTO: 98 FL — SIGNIFICANT CHANGE UP (ref 80–100)
MONOCYTES # BLD AUTO: 0.86 K/UL — SIGNIFICANT CHANGE UP (ref 0–0.9)
MONOCYTES NFR BLD AUTO: 5.4 % — SIGNIFICANT CHANGE UP (ref 2–14)
MUCOUS THREADS # UR AUTO: SIGNIFICANT CHANGE UP
NEUTROPHILS # BLD AUTO: 14.08 K/UL — HIGH (ref 1.8–7.4)
NEUTROPHILS NFR BLD AUTO: 88.5 % — HIGH (ref 43–77)
NITRITE UR-MCNC: NEGATIVE — SIGNIFICANT CHANGE UP
NON-SQ EPI CELLS # UR AUTO: <1 — SIGNIFICANT CHANGE UP
NRBC # FLD: 0 — SIGNIFICANT CHANGE UP
OB PNL STL: POSITIVE — SIGNIFICANT CHANGE UP
PH UR: 5.5 — SIGNIFICANT CHANGE UP (ref 4.6–8)
PHOSPHATE SERPL-MCNC: 4.1 MG/DL — SIGNIFICANT CHANGE UP (ref 2.5–4.5)
PLATELET # BLD AUTO: 194 K/UL — SIGNIFICANT CHANGE UP (ref 150–400)
PMV BLD: 10.3 FL — SIGNIFICANT CHANGE UP (ref 7–13)
POTASSIUM SERPL-MCNC: 3.8 MMOL/L — SIGNIFICANT CHANGE UP (ref 3.5–5.3)
POTASSIUM SERPL-MCNC: 3.8 MMOL/L — SIGNIFICANT CHANGE UP (ref 3.5–5.3)
POTASSIUM SERPL-SCNC: 3.8 MMOL/L — SIGNIFICANT CHANGE UP (ref 3.5–5.3)
POTASSIUM SERPL-SCNC: 3.8 MMOL/L — SIGNIFICANT CHANGE UP (ref 3.5–5.3)
PROT SERPL-MCNC: 7.4 G/DL — SIGNIFICANT CHANGE UP (ref 6–8.3)
PROT UR-MCNC: NEGATIVE MG/DL — SIGNIFICANT CHANGE UP
PTH-INTACT SERPL-MCNC: 273.6 PG/ML — HIGH (ref 15–65)
RBC # BLD: 2.93 M/UL — LOW (ref 4.2–5.8)
RBC # FLD: 14.3 % — SIGNIFICANT CHANGE UP (ref 10.3–14.5)
RBC CASTS # UR COMP ASSIST: SIGNIFICANT CHANGE UP (ref 0–?)
SODIUM SERPL-SCNC: 136 MMOL/L — SIGNIFICANT CHANGE UP (ref 135–145)
SODIUM SERPL-SCNC: 136 MMOL/L — SIGNIFICANT CHANGE UP (ref 135–145)
SP GR SPEC: 1.02 — SIGNIFICANT CHANGE UP (ref 1–1.04)
SQUAMOUS # UR AUTO: SIGNIFICANT CHANGE UP
TROPONIN T SERPL-MCNC: 0.09 NG/ML — HIGH (ref 0–0.06)
UIBC SERPL-MCNC: 344 UG/DL — SIGNIFICANT CHANGE UP (ref 110–370)
UROBILINOGEN FLD QL: NORMAL MG/DL — SIGNIFICANT CHANGE UP
UUN UR-MCNC: 557.5 MG/DL — SIGNIFICANT CHANGE UP
WBC # BLD: 15.93 K/UL — HIGH (ref 3.8–10.5)
WBC # FLD AUTO: 15.93 K/UL — HIGH (ref 3.8–10.5)
WBC UR QL: SIGNIFICANT CHANGE UP (ref 0–?)

## 2018-02-15 PROCEDURE — 74019 RADEX ABDOMEN 2 VIEWS: CPT | Mod: 26

## 2018-02-15 PROCEDURE — 93010 ELECTROCARDIOGRAM REPORT: CPT

## 2018-02-15 PROCEDURE — 74176 CT ABD & PELVIS W/O CONTRAST: CPT | Mod: 26

## 2018-02-15 PROCEDURE — 76700 US EXAM ABDOM COMPLETE: CPT | Mod: 26

## 2018-02-15 RX ORDER — ALBUTEROL 90 UG/1
2 AEROSOL, METERED ORAL EVERY 6 HOURS
Qty: 0 | Refills: 0 | Status: DISCONTINUED | OUTPATIENT
Start: 2018-02-15 | End: 2018-02-20

## 2018-02-15 RX ORDER — PIPERACILLIN AND TAZOBACTAM 4; .5 G/20ML; G/20ML
3.38 INJECTION, POWDER, LYOPHILIZED, FOR SOLUTION INTRAVENOUS EVERY 12 HOURS
Qty: 0 | Refills: 0 | Status: DISCONTINUED | OUTPATIENT
Start: 2018-02-15 | End: 2018-02-16

## 2018-02-15 RX ORDER — IRON SUCROSE 20 MG/ML
200 INJECTION, SOLUTION INTRAVENOUS ONCE
Qty: 0 | Refills: 0 | Status: COMPLETED | OUTPATIENT
Start: 2018-02-15 | End: 2018-02-15

## 2018-02-15 RX ORDER — SEVELAMER CARBONATE 2400 MG/1
800 POWDER, FOR SUSPENSION ORAL
Qty: 0 | Refills: 0 | Status: DISCONTINUED | OUTPATIENT
Start: 2018-02-15 | End: 2018-02-20

## 2018-02-15 RX ADMIN — Medication 100 MILLIGRAM(S): at 05:11

## 2018-02-15 RX ADMIN — CARVEDILOL PHOSPHATE 12.5 MILLIGRAM(S): 80 CAPSULE, EXTENDED RELEASE ORAL at 05:11

## 2018-02-15 RX ADMIN — SIMETHICONE 80 MILLIGRAM(S): 80 TABLET, CHEWABLE ORAL at 05:12

## 2018-02-15 RX ADMIN — Medication 100 MILLIGRAM(S): at 17:58

## 2018-02-15 RX ADMIN — LOSARTAN POTASSIUM 25 MILLIGRAM(S): 100 TABLET, FILM COATED ORAL at 05:12

## 2018-02-15 RX ADMIN — Medication 0.4 MILLIGRAM(S): at 13:38

## 2018-02-15 RX ADMIN — Medication 667 MILLIGRAM(S): at 13:37

## 2018-02-15 RX ADMIN — Medication 667 MILLIGRAM(S): at 09:32

## 2018-02-15 RX ADMIN — HEPARIN SODIUM 5000 UNIT(S): 5000 INJECTION INTRAVENOUS; SUBCUTANEOUS at 05:12

## 2018-02-15 RX ADMIN — CARVEDILOL PHOSPHATE 12.5 MILLIGRAM(S): 80 CAPSULE, EXTENDED RELEASE ORAL at 17:27

## 2018-02-15 RX ADMIN — TIOTROPIUM BROMIDE 1 CAPSULE(S): 18 CAPSULE ORAL; RESPIRATORY (INHALATION) at 09:32

## 2018-02-15 RX ADMIN — Medication 2000 UNIT(S): at 13:37

## 2018-02-15 RX ADMIN — SIMETHICONE 80 MILLIGRAM(S): 80 TABLET, CHEWABLE ORAL at 17:27

## 2018-02-15 RX ADMIN — ALBUTEROL 2 PUFF(S): 90 AEROSOL, METERED ORAL at 05:11

## 2018-02-15 RX ADMIN — Medication 40 MILLIGRAM(S): at 05:12

## 2018-02-15 RX ADMIN — PANTOPRAZOLE SODIUM 40 MILLIGRAM(S): 20 TABLET, DELAYED RELEASE ORAL at 16:22

## 2018-02-15 RX ADMIN — Medication 20 MILLIGRAM(S): at 19:09

## 2018-02-15 RX ADMIN — PIPERACILLIN AND TAZOBACTAM 25 GRAM(S): 4; .5 INJECTION, POWDER, LYOPHILIZED, FOR SOLUTION INTRAVENOUS at 23:02

## 2018-02-15 RX ADMIN — Medication 300 MILLIGRAM(S): at 16:22

## 2018-02-15 RX ADMIN — METHADONE HYDROCHLORIDE 10 MILLIGRAM(S): 40 TABLET ORAL at 13:50

## 2018-02-15 RX ADMIN — Medication 81 MILLIGRAM(S): at 13:37

## 2018-02-15 RX ADMIN — CITALOPRAM 40 MILLIGRAM(S): 10 TABLET, FILM COATED ORAL at 13:37

## 2018-02-15 RX ADMIN — HEPARIN SODIUM 5000 UNIT(S): 5000 INJECTION INTRAVENOUS; SUBCUTANEOUS at 17:28

## 2018-02-15 RX ADMIN — PANTOPRAZOLE SODIUM 40 MILLIGRAM(S): 20 TABLET, DELAYED RELEASE ORAL at 05:15

## 2018-02-15 RX ADMIN — ALBUTEROL 2 PUFF(S): 90 AEROSOL, METERED ORAL at 09:31

## 2018-02-15 RX ADMIN — CALCITRIOL 0.5 MICROGRAM(S): 0.5 CAPSULE ORAL at 13:37

## 2018-02-15 RX ADMIN — ATORVASTATIN CALCIUM 20 MILLIGRAM(S): 80 TABLET, FILM COATED ORAL at 23:02

## 2018-02-15 RX ADMIN — IRON SUCROSE 110 MILLIGRAM(S): 20 INJECTION, SOLUTION INTRAVENOUS at 17:57

## 2018-02-15 RX ADMIN — Medication 100 MILLIGRAM(S): at 09:32

## 2018-02-15 RX ADMIN — MAGNESIUM OXIDE 400 MG ORAL TABLET 400 MILLIGRAM(S): 241.3 TABLET ORAL at 09:32

## 2018-02-15 RX ADMIN — SEVELAMER CARBONATE 800 MILLIGRAM(S): 2400 POWDER, FOR SUSPENSION ORAL at 17:27

## 2018-02-15 RX ADMIN — Medication 300 MILLIGRAM(S): at 05:15

## 2018-02-15 NOTE — CONSULT NOTE ADULT - ASSESSMENT
67 year old male with history of CHF presents with CP. Nephrology consulted for elevated Scr.
67M with multiple medical problems presents with diarrhea and abdominal pain for 4 days    -Exam significant for RUQ tenderness, and leukocytosis  -U/S abdomen equivocal  -CT abdomen now to evaluate possible biliary disease, will follow up official read  -patient ordered for a HIDA scan, will follow up results   -f/u GI  -will discuss with Dr. Kendall and follow the patient
68 y/o M with h/o HTN, HLD, CKD stage IV, CAD s/p 1 stent, AAA repair, COPD presents to the ED for chest pain X 1 day. Pt states he has had left sided constant chest pain with no radiation, nonexertional, nonpleuritic. Pt states the chest pain has currently resolved. Pt also states he suffers from chronic back pain since 1971 for years now. Pt denies LOC, syncope, fever, chills, palpitations shortness of breath, N/V/D/C, tingling, dysuria, urinary/bowel incontinence or any other complaints at this time. (14 Feb 2018 02:20)    Pt c/o gas like lower abd pain for past 2-3 days.  He denies fevers/chills/rigors/N/V/diarrhea.  He has history of gallstones, and opted not to remove his GB during his gastric bypass surgery.  WBC increased today.  Abd US with gallstones and distention.      Problem/Plan - 1:  ·	Leukocytosis    - In the setting of abdominal pain.  Abd US with gallstones and distention.  Pt with h/o of cholelithiasis in the past.  No fever or elevated LFTs.      - Will start zosyn on empiric basis to cover for possible cholecystitis vs. alternate intra-abdominal infection    - Awaiting CTAP with PO contrast    - HIDA scan ordered    - Monitor WBC and temp curve    - F/u blood and urine cultures    Will follow,    D/W wife at bedside.    Belinda Madrigal  [phone]

## 2018-02-15 NOTE — CONSULT NOTE ADULT - SUBJECTIVE AND OBJECTIVE BOX
Patient is a 67y old  Male who presents with a chief complaint of chest pain (2018 02:20)      HPI:  66 y/o M with h/o HTN, HLD, CKD stage IV, CAD s/p 1 stent, AAA repair, COPD presents to the ED for chest pain X 1 day. Pt states he has had left sided constant chest pain with no radiation, nonexertional, nonpleuritic. Pt states the chest pain has currently resolved. Pt also states he suffers from chronic back pain since  for years now. Pt denies LOC, syncope, fever, chills, palpitations shortness of breath, N/V/D/C, tingling, dysuria, urinary/bowel incontinence or any other complaints at this time. (2018 02:20)      REVIEW OF SYSTEMS:    CONSTITUTIONAL: No fever, weight loss, or fatigue  EYES: No eye pain, visual disturbances, or discharge  ENMT:  No sore throat  NECK: No pain or stiffness  RESPIRATORY: No cough, wheezing, chills or hemoptysis; No shortness of breath  CARDIOVASCULAR: No chest pain, palpitations, dizziness, or leg swelling  GASTROINTESTINAL: No abdominal or epigastric pain. No nausea, vomiting, or hematemesis; No diarrhea or constipation. No melena or hematochezia.  GENITOURINARY: No dysuria, frequency, hematuria, or incontinence  NEUROLOGICAL: No headaches, memory loss, loss of strength, numbness, or tremors  SKIN: No itching, burning, rashes, or lesions   LYMPH NODES: No enlarged glands  MUSCULOSKELETAL: No joint pain or swelling; No muscle, back, or extremity pain      PAST MEDICAL & SURGICAL HISTORY:  Sleep apnea  Chronic back pain  CKD (chronic kidney disease)  AAA (abdominal aortic aneurysm)  COPD bronchitis  Hyperlipidemia  Hypertension  Hernia  Gastric bypass status for obesity  Abdominal aortic aneurysm      Allergies    No Known Allergies    Intolerances        FAMILY HISTORY:  No pertinent family history in first degree relatives      SOCIAL HISTORY:        MEDICATIONS  (STANDING):  allopurinol 100 milliGRAM(s) Oral two times a day after meals  aspirin  chewable 81 milliGRAM(s) Oral daily  atorvastatin 20 milliGRAM(s) Oral at bedtime  calcitriol   Capsule 0.5 MICROGram(s) Oral daily  carvedilol 12.5 milliGRAM(s) Oral every 12 hours  cholecalciferol 2000 Unit(s) Oral daily  citalopram 40 milliGRAM(s) Oral daily  dicyclomine 20 milliGRAM(s) Oral two times a day before meals  docusate sodium 100 milliGRAM(s) Oral two times a day  folic acid 0.4 milliGRAM(s) Oral daily  gemfibrozil 300 milliGRAM(s) Oral two times a day before meals  heparin  Injectable 5000 Unit(s) SubCutaneous every 12 hours  iron sucrose IVPB 200 milliGRAM(s) IV Intermittent once  pantoprazole    Tablet 40 milliGRAM(s) Oral two times a day before meals  sevelamer hydrochloride 800 milliGRAM(s) Oral three times a day with meals  simethicone 80 milliGRAM(s) Chew two times a day  tiotropium 18 MICROgram(s) Capsule 1 Capsule(s) Inhalation daily    MEDICATIONS  (PRN):  ALBUTerol    90 MICROgram(s) HFA Inhaler 2 Puff(s) Inhalation every 6 hours PRN Cough, Wheezing  methadone    Tablet 10 milliGRAM(s) Oral five times a day PRN severe pain  oxyCODONE    5 mG/acetaminophen 325 mG 2 Tablet(s) Oral every 6 hours PRN moderate, severe pain      Vital Signs Last 24 Hrs  T(C): 36.7 (15 Feb 2018 12:33), Max: 37.2 (15 Feb 2018 04:51)  T(F): 98 (15 Feb 2018 12:33), Max: 98.9 (15 Feb 2018 04:51)  HR: 97 (15 Feb 2018 12:33) (62 - 97)  BP: 113/68 (15 Feb 2018 12:33) (110/60 - 115/62)  BP(mean): --  RR: 16 (15 Feb 2018 12:33) (12 - 16)  SpO2: 97% (15 Feb 2018 12:33) (95% - 97%)    PHYSICAL EXAM:    GENERAL: NAD, well-groomed  HEAD:  Atraumatic, Normocephalic  EYES: EOMI, PERRLA, conjunctiva and sclera clear  ENMT: No tonsillar erythema, exudates, or enlargement; Moist mucous membranes  NECK: Supple, No JVD  CHEST/LUNG: Clear to percussion bilaterally; No rales, rhonchi, wheezing, or rubs  HEART: Regular rate and rhythm; No murmurs, rubs, or gallops  ABDOMEN: Soft, Nontender, Nondistended; Bowel sounds present  EXTREMITIES:  2+ Peripheral Pulses, No clubbing, cyanosis, or edema  LYMPH: No lymphadenopathy noted  SKIN: No rashes or lesions    LABS:  CBC Full  -  ( 15 Feb 2018 06:00 )  WBC Count : 15.93 K/uL  Hemoglobin : 9.8 g/dL  Hematocrit : 28.7 %  Platelet Count - Automated : 194 K/uL  Mean Cell Volume : 98.0 fL  Mean Cell Hemoglobin : 33.4 pg  Mean Cell Hemoglobin Concentration : 34.1 %  Auto Neutrophil # : 14.08 K/uL  Auto Lymphocyte # : 0.82 K/uL  Auto Monocyte # : 0.86 K/uL  Auto Eosinophil # : 0.08 K/uL  Auto Basophil # : 0.02 K/uL  Auto Neutrophil % : 88.5 %  Auto Lymphocyte % : 5.1 %  Auto Monocyte % : 5.4 %  Auto Eosinophil % : 0.5 %  Auto Basophil % : 0.1 %      02-15    136  |  92<L>  |  131<H>  ----------------------------<  112<H>  3.8   |  24  |  3.00<H>    Ca    9.8      15 Feb 2018 06:00  Phos  4.1     02-15  Mg     1.8     02-15    TPro  7.9  /  Alb  4.4  /  TBili  0.3  /  DBili  x   /  AST  23  /  ALT  11  /  AlkPhos  98  02-13      LIVER FUNCTIONS - ( 2018 20:05 )  Alb: 4.4 g/dL / Pro: 7.9 g/dL / ALK PHOS: 98 u/L / ALT: 11 u/L / AST: 23 u/L / GGT: x                 MICROBIOLOGY:    Urinalysis Basic - ( 15 Feb 2018 14:51 )  Color: YELLOW / Appearance: CLEAR / S.017 / pH: 5.5  Gluc: NEGATIVE / Ketone: NEGATIVE  / Bili: NEGATIVE / Urobili: NORMAL mg/dL   Blood: NEGATIVE / Protein: NEGATIVE mg/dL / Nitrite: NEGATIVE   Leuk Esterase: NEGATIVE / RBC: 0-2 / WBC 2-5   Sq Epi: OCC / Non Sq Epi: x / Bacteria: FEW        RADIOLOGY:    < from: US Abdomen Complete (02.15.18 @ 13:06) >  IMPRESSION:     Distended gallbladder with gallstones. Consider a HIDA scan for further   evaluation.    Nonobstructing right kidney stone.    < end of copied text >        < from: Xray Abdomen 2 Views (02.15.18 @ 10:39) >  FINDINGS:     There is an abdominal aortic vascular stent.  There is paucity of gas in the right hemiabdomen. Bowel gas pattern is   nonobstructive.  There is a 5 mm radiopaque density overlying the lower pole of the right   kidney which may be a nonobstructing renal calculus.  There is scoliosis anddegenerative changes of the spine.      IMPRESSION:   5 mm radiopaque density overlying the lower pole of right kidney may be a   nonobstructing renal calculus. Correlate with pending ultrasound of the   abdomen.  Nonobstructive bowel gas pattern.    < end of copied text >      < from: Xray Chest 2 Views PA/Lat (18 @ 21:34) >  FINDINGS:     The cardiac silhouette is normal in size. There is no pleural effusion.   There is no pneumothorax. Right midlung linear atelectasis. There are   degenerative changes of the visualized thoracic spine. Thin radiopaque   linear object overlies the upper chest likely the band from a facemask,   given history.    IMPRESSION:   No acute pulmonary disease.    < end of copied text > Patient is a 67y old  Male who presents with a chief complaint of chest pain (2018 02:20)      HPI:  66 y/o M with h/o HTN, HLD, CKD stage IV, CAD s/p 1 stent, AAA repair, COPD presents to the ED for chest pain X 1 day. Pt states he has had left sided constant chest pain with no radiation, nonexertional, nonpleuritic. Pt states the chest pain has currently resolved. Pt also states he suffers from chronic back pain since  for years now. Pt denies LOC, syncope, fever, chills, palpitations shortness of breath, N/V/D/C, tingling, dysuria, urinary/bowel incontinence or any other complaints at this time. (2018 02:20)    Pt c/o gas like lower abd pain for past 2-3 days.  He denies fevers/chills/rigors/N/V/diarrhea.  He has history of gallstones, and opted not to remove his GB during his gastric bypass surgery.  WBC increased today.  Abd US with gallstones and GB wall edema.  ID consult called for further evaluation of infectious causes for abd pain.  Wife present at bedside.        REVIEW OF SYSTEMS:    CONSTITUTIONAL: No fever, weight loss, or fatigue  EYES: No eye pain, visual disturbances, or discharge  ENMT:  No sore throat  NECK: No pain or stiffness  RESPIRATORY: No cough, wheezing, chills or hemoptysis; No shortness of breath  CARDIOVASCULAR: No chest pain, palpitations, dizziness, or leg swelling  GASTROINTESTINAL: (+) lower abd pain "gas pain"  GENITOURINARY: No dysuria, frequency, hematuria, or incontinence  NEUROLOGICAL: No headaches, memory loss, loss of strength, numbness, or tremors  SKIN: No itching, burning, rashes, or lesions   LYMPH NODES: No enlarged glands  MUSCULOSKELETAL: No joint pain or swelling; No muscle, back, or extremity pain      PAST MEDICAL & SURGICAL HISTORY:  Sleep apnea  Chronic back pain  CKD (chronic kidney disease)  AAA (abdominal aortic aneurysm)  COPD bronchitis  Hyperlipidemia  Hypertension  Hernia  Gastric bypass status for obesity  Abdominal aortic aneurysm      Allergies    No Known Allergies    Intolerances        FAMILY HISTORY:  No pertinent family history in first degree relatives      SOCIAL HISTORY:  , lives with wife.  No smoking, ivdu, etoh      MEDICATIONS  (STANDING):  allopurinol 100 milliGRAM(s) Oral two times a day after meals  aspirin  chewable 81 milliGRAM(s) Oral daily  atorvastatin 20 milliGRAM(s) Oral at bedtime  calcitriol   Capsule 0.5 MICROGram(s) Oral daily  carvedilol 12.5 milliGRAM(s) Oral every 12 hours  cholecalciferol 2000 Unit(s) Oral daily  citalopram 40 milliGRAM(s) Oral daily  dicyclomine 20 milliGRAM(s) Oral two times a day before meals  docusate sodium 100 milliGRAM(s) Oral two times a day  folic acid 0.4 milliGRAM(s) Oral daily  gemfibrozil 300 milliGRAM(s) Oral two times a day before meals  heparin  Injectable 5000 Unit(s) SubCutaneous every 12 hours  iron sucrose IVPB 200 milliGRAM(s) IV Intermittent once  pantoprazole    Tablet 40 milliGRAM(s) Oral two times a day before meals  sevelamer hydrochloride 800 milliGRAM(s) Oral three times a day with meals  simethicone 80 milliGRAM(s) Chew two times a day  tiotropium 18 MICROgram(s) Capsule 1 Capsule(s) Inhalation daily    MEDICATIONS  (PRN):  ALBUTerol    90 MICROgram(s) HFA Inhaler 2 Puff(s) Inhalation every 6 hours PRN Cough, Wheezing  methadone    Tablet 10 milliGRAM(s) Oral five times a day PRN severe pain  oxyCODONE    5 mG/acetaminophen 325 mG 2 Tablet(s) Oral every 6 hours PRN moderate, severe pain      Vital Signs Last 24 Hrs  T(C): 36.7 (15 Feb 2018 12:33), Max: 37.2 (15 Feb 2018 04:51)  T(F): 98 (15 Feb 2018 12:33), Max: 98.9 (15 Feb 2018 04:51)  HR: 97 (15 Feb 2018 12:33) (62 - 97)  BP: 113/68 (15 Feb 2018 12:33) (110/60 - 115/62)  BP(mean): --  RR: 16 (15 Feb 2018 12:33) (12 - 16)  SpO2: 97% (15 Feb 2018 12:33) (95% - 97%)    PHYSICAL EXAM:    GENERAL: NAD, well-groomed, obese  HEAD:  Atraumatic, Normocephalic  EYES: EOMI, PERRLA, conjunctiva and sclera clear  ENMT: No tonsillar erythema, exudates, or enlargement; Moist mucous membranes  NECK: Supple, No JVD  CHEST/LUNG: Clear to percussion bilaterally; No rales, rhonchi, wheezing, or rubs  HEART: Regular rate and rhythm; No murmurs, rubs, or gallops  ABDOMEN: Soft, Nontender, Nondistended; Bowel sounds present, no rebound or guarding.  No Rasmussen's sign  EXTREMITIES:  2+ Peripheral Pulses, No clubbing, cyanosis, or edema  LYMPH: No lymphadenopathy noted  SKIN: No rashes or lesions    LABS:  CBC Full  -  ( 15 Feb 2018 06:00 )  WBC Count : 15.93 K/uL  Hemoglobin : 9.8 g/dL  Hematocrit : 28.7 %  Platelet Count - Automated : 194 K/uL  Mean Cell Volume : 98.0 fL  Mean Cell Hemoglobin : 33.4 pg  Mean Cell Hemoglobin Concentration : 34.1 %  Auto Neutrophil # : 14.08 K/uL  Auto Lymphocyte # : 0.82 K/uL  Auto Monocyte # : 0.86 K/uL  Auto Eosinophil # : 0.08 K/uL  Auto Basophil # : 0.02 K/uL  Auto Neutrophil % : 88.5 %  Auto Lymphocyte % : 5.1 %  Auto Monocyte % : 5.4 %  Auto Eosinophil % : 0.5 %  Auto Basophil % : 0.1 %      0215    136  |  92<L>  |  131<H>  ----------------------------<  112<H>  3.8   |  24  |  3.00<H>    Ca    9.8      15 Feb 2018 06:00  Phos  4.1     -15  Mg     1.8     -15    TPro  7.9  /  Alb  4.4  /  TBili  0.3  /  DBili  x   /  AST  23  /  ALT  11  /  AlkPhos  98  02-13      LIVER FUNCTIONS - ( 2018 20:05 )  Alb: 4.4 g/dL / Pro: 7.9 g/dL / ALK PHOS: 98 u/L / ALT: 11 u/L / AST: 23 u/L / GGT: x                 MICROBIOLOGY:    Urinalysis Basic - ( 15 Feb 2018 14:51 )  Color: YELLOW / Appearance: CLEAR / S.017 / pH: 5.5  Gluc: NEGATIVE / Ketone: NEGATIVE  / Bili: NEGATIVE / Urobili: NORMAL mg/dL   Blood: NEGATIVE / Protein: NEGATIVE mg/dL / Nitrite: NEGATIVE   Leuk Esterase: NEGATIVE / RBC: 0-2 / WBC 2-5   Sq Epi: OCC / Non Sq Epi: x / Bacteria: FEW        RADIOLOGY:    < from: US Abdomen Complete (02.15.18 @ 13:06) >  IMPRESSION:     Distended gallbladder with gallstones. Consider a HIDA scan for further   evaluation.    Nonobstructing right kidney stone.    < end of copied text >        < from: Xray Abdomen 2 Views (02.15.18 @ 10:39) >  FINDINGS:     There is an abdominal aortic vascular stent.  There is paucity of gas in the right hemiabdomen. Bowel gas pattern is   nonobstructive.  There is a 5 mm radiopaque density overlying the lower pole of the right   kidney which may be a nonobstructing renal calculus.  There is scoliosis anddegenerative changes of the spine.      IMPRESSION:   5 mm radiopaque density overlying the lower pole of right kidney may be a   nonobstructing renal calculus. Correlate with pending ultrasound of the   abdomen.  Nonobstructive bowel gas pattern.    < end of copied text >      < from: Xray Chest 2 Views PA/Lat (18 @ 21:34) >  FINDINGS:     The cardiac silhouette is normal in size. There is no pleural effusion.   There is no pneumothorax. Right midlung linear atelectasis. There are   degenerative changes of the visualized thoracic spine. Thin radiopaque   linear object overlies the upper chest likely the band from a facemask,   given history.    IMPRESSION:   No acute pulmonary disease.    < end of copied text >

## 2018-02-15 NOTE — CONSULT NOTE ADULT - SUBJECTIVE AND OBJECTIVE BOX
GENERAL SURGERY CONSULT NOTE    67M with multiple medical comorbidities and past surgical history of AAA repair in , cardiac stents placed in , and a gastric bypass in  initially presented to the ED with chest pain, now surgery consulted for abdominal pain.    Patient states that his pain began between 3 and 4 days ago and is diffuse throughout his abdomen. Pain associated with decreased appetite since -Monday of this week and has been pretty much constant. Pain is not associated with particular foods or with nausea or vomiting. Patient does endorse frequent diarrhea, stating he has had 16 bowel movements per day for the past few days and around 30 yesterday. He feels the urge to pass flatus, and goes to the toilet and passes liquid stool and urine. No fevers at home, but endorses always feeling cold.     Patient's WBC has up trended 6 -> 16 from yesterday today. He has remained afebrile. U/S showed CBD 7mm with distended gallbladder with sludge and stones. No wall thickening or pericolic fluid.  On exam patient with diffuse abdominal tenderness, worse in the right upper quadrant with a positive Rasmussen's sign.     HPI:  66 y/o M with h/o HTN, HLD, CKD stage IV, CAD s/p 1 stent, AAA repair, COPD presents to the ED for chest pain X 1 day. Pt states he has had left sided constant chest pain with no radiation, nonexertional, nonpleuritic. Pt states the chest pain has currently resolved. Pt also states he suffers from chronic back pain since  for years now. Pt denies LOC, syncope, fever, chills, palpitations shortness of breath, N/V/D/C, tingling, dysuria, urinary/bowel incontinence or any other complaints at this time. (2018 02:20)      PAST MEDICAL & SURGICAL HISTORY:  Sleep apnea  Chronic back pain  CKD (chronic kidney disease)  AAA (abdominal aortic aneurysm)  COPD bronchitis  Hyperlipidemia  Hypertension  Hernia  Gastric bypass status for obesity  Abdominal aortic aneurysm      FAMILY HISTORY:  No pertinent family history in first degree relatives    SOCIAL HISTORY:    MEDICATIONS  (STANDING):  allopurinol 100 milliGRAM(s) Oral two times a day after meals  aspirin  chewable 81 milliGRAM(s) Oral daily  atorvastatin 20 milliGRAM(s) Oral at bedtime  calcitriol   Capsule 0.5 MICROGram(s) Oral daily  carvedilol 12.5 milliGRAM(s) Oral every 12 hours  cholecalciferol 2000 Unit(s) Oral daily  citalopram 40 milliGRAM(s) Oral daily  dicyclomine 20 milliGRAM(s) Oral two times a day before meals  docusate sodium 100 milliGRAM(s) Oral two times a day  folic acid 0.4 milliGRAM(s) Oral daily  gemfibrozil 300 milliGRAM(s) Oral two times a day before meals  heparin  Injectable 5000 Unit(s) SubCutaneous every 12 hours  pantoprazole    Tablet 40 milliGRAM(s) Oral two times a day before meals  piperacillin/tazobactam IVPB. 3.375 Gram(s) IV Intermittent every 12 hours  sevelamer hydrochloride 800 milliGRAM(s) Oral three times a day with meals  simethicone 80 milliGRAM(s) Chew two times a day  tiotropium 18 MICROgram(s) Capsule 1 Capsule(s) Inhalation daily    MEDICATIONS  (PRN):  ALBUTerol    90 MICROgram(s) HFA Inhaler 2 Puff(s) Inhalation every 6 hours PRN Cough, Wheezing  methadone    Tablet 10 milliGRAM(s) Oral five times a day PRN severe pain  oxyCODONE    5 mG/acetaminophen 325 mG 2 Tablet(s) Oral every 6 hours PRN moderate, severe pain    Allergies    No Known Allergies    Intolerances    PHYSICAL EXAM     Vital Signs Last 24 Hrs  T(C): 36.9 (15 Feb 2018 20:51), Max: 37.2 (15 Feb 2018 04:51)  T(F): 98.5 (15 Feb 2018 20:51), Max: 98.9 (15 Feb 2018 04:51)  HR: 63 (15 Feb 2018 20:51) (63 - 97)  BP: 100/64 (15 Feb 2018 20:51) (100/64 - 115/62)  BP(mean): --  RR: 14 (15 Feb 2018 20:51) (12 - 16)  SpO2: 95% (15 Feb 2018 20:51) (95% - 97%)  Daily     Daily Weight in k (15 Feb 2018 07:50)    General: WN/WD NAD  Neurology: A&Ox3, nonfocal, ERVIN x 4  Head:  Normocephalic, atraumatic  ENT:  Mucosa moist, no ulcerations  Neck:  Supple, no sinuses or palpable masses  Lymphatic:  No palpable cervical, supraclavicular, axillary or inguinal adenopathy  Respiratory: CTA B/L  CV: RRR, S1S2, no murmur  Abdominal: Softly distended, midline incision healed, RUQ tenderness to palpation with + Rasmussen's sign   MSK: No edema, + peripheral pulses, FROM all 4 extremity                            9.8    15.93 )-----------( 194      ( 15 Feb 2018 06:00 )             28.7     02-15    136  |  92<L>  |  131<H>  ----------------------------<  112<H>  3.8   |  24  |  3.00<H>    Ca    9.8      15 Feb 2018 06:00  Phos  4.1     -15  Mg     1.8     02-15    TPro  7.4  /  Alb  4.4  /  TBili  0.4  /  DBili  0.1  /  AST  16  /  ALT  6   /  AlkPhos  101  15      Urinalysis Basic - ( 15 Feb 2018 14:51 )    Color: YELLOW / Appearance: CLEAR / S.017 / pH: 5.5  Gluc: NEGATIVE / Ketone: NEGATIVE  / Bili: NEGATIVE / Urobili: NORMAL mg/dL   Blood: NEGATIVE / Protein: NEGATIVE mg/dL / Nitrite: NEGATIVE   Leuk Esterase: NEGATIVE / RBC: 0-2 / WBC 2-5   Sq Epi: OCC / Non Sq Epi: x / Bacteria: FEW        IMAGING STUDIES:

## 2018-02-15 NOTE — PROGRESS NOTE ADULT - SUBJECTIVE AND OBJECTIVE BOX
CARDIOLOGY FOLLOW UP - Dr. Serra    CC no chest pain no sob   c.o abd discomfort       PHYSICAL EXAM:  T(C): 37.2 (02-15-18 @ 04:51), Max: 37.2 (02-15-18 @ 04:51)  HR: 71 (02-15-18 @ 04:51) (60 - 71)  BP: 115/62 (02-15-18 @ 04:51) (110/60 - 232/60)  RR: 12 (02-15-18 @ 04:51) (12 - 16)  SpO2: 97% (02-15-18 @ 04:51) (95% - 97%)  Wt(kg): --  I&O's Summary    14 Feb 2018 07:01  -  15 Feb 2018 07:00  --------------------------------------------------------  IN: 520 mL / OUT: 450 mL / NET: 70 mL        Appearance: Normal	  Cardiovascular: Normal S1 S2,RRR, No JVD, No murmurs  Respiratory: Lungs clear to auscultation	  Gastrointestinal:  distended , + slight tenderness , + BS	  Extremities: Normal range of motion, No clubbing, cyanosis or edema        MEDICATIONS  (STANDING):  ALBUTerol    90 MICROgram(s) HFA Inhaler 2 Puff(s) Inhalation every 6 hours  allopurinol 100 milliGRAM(s) Oral two times a day after meals  aspirin  chewable 81 milliGRAM(s) Oral daily  atorvastatin 20 milliGRAM(s) Oral at bedtime  calcitriol   Capsule 0.5 MICROGram(s) Oral daily  calcium acetate 667 milliGRAM(s) Oral three times a day with meals  carvedilol 12.5 milliGRAM(s) Oral every 12 hours  cholecalciferol 2000 Unit(s) Oral daily  citalopram 40 milliGRAM(s) Oral daily  docusate sodium 100 milliGRAM(s) Oral two times a day  folic acid 0.4 milliGRAM(s) Oral daily  furosemide    Tablet 40 milliGRAM(s) Oral every 12 hours  gemfibrozil 300 milliGRAM(s) Oral two times a day before meals  heparin  Injectable 5000 Unit(s) SubCutaneous every 12 hours  losartan 25 milliGRAM(s) Oral daily  magnesium oxide 400 milliGRAM(s) Oral two times a day with meals  pantoprazole    Tablet 40 milliGRAM(s) Oral two times a day before meals  pregabalin 75 milliGRAM(s) Oral daily  simethicone 80 milliGRAM(s) Chew two times a day  tiotropium 18 MICROgram(s) Capsule 1 Capsule(s) Inhalation daily      TELEMETRY: NSR/ 1st degree AVB/PVC 	    ECG:  	  RADIOLOGY:   DIAGNOSTIC TESTING:  [x ] Echocardiogram:   from: TTE with Doppler (w/Cont) (02.14.18 @ 11:14) >  CONCLUSIONS:  Technically very difficult study. The LV is seen in limited  views, and exclusively from the apical perspective.  1. Endocardium not well visualized; grossly normal left  ventricular systolic function. Endocardial visualization  enhanced with intravenous injection of echocontrast  (Definity). EF 55-60%.  2. Unable to comment on right ventricular or valvular  function.  ------------------------------------------------------------------------    < end of copied text >    [ ] Stress Test:    OTHER: 	  < from: Xray Chest 2 Views PA/Lat (02.13.18 @ 21:34) >    FINDINGS:     The cardiac silhouette is normal in size. There is no pleural effusion.   There is no pneumothorax. Right midlung linear atelectasis. There are   degenerative changes of the visualized thoracic spine. Thin radiopaque   linear object overlies the upper chest likely the band from a facemask,   given history.    IMPRESSION:   No acute pulmonary disease.          < end of copied text >    LABS:	 	        CKMB: 2.45 ng/mL (02-14 @ 09:44)                          9.8    15.93 )-----------( 194      ( 15 Feb 2018 06:00 )             28.7     02-15    136  |  92<L>  |  131<H>  ----------------------------<  112<H>  3.8   |  24  |  3.00<H>    Ca    9.8      15 Feb 2018 06:00  Phos  4.1     02-15  Mg     1.8     02-15    TPro  7.9  /  Alb  4.4  /  TBili  0.3  /  DBili  x   /  AST  23  /  ALT  11  /  AlkPhos  98  02-13 CARDIOLOGY FOLLOW UP - Dr. Serra    CC no chest pain no sob   c.o abd discomfort       PHYSICAL EXAM:  T(C): 37.2 (02-15-18 @ 04:51), Max: 37.2 (02-15-18 @ 04:51)  HR: 71 (02-15-18 @ 04:51) (60 - 71)  BP: 115/62 (02-15-18 @ 04:51) (110/60 - 232/60)  RR: 12 (02-15-18 @ 04:51) (12 - 16)  SpO2: 97% (02-15-18 @ 04:51) (95% - 97%)  Wt(kg): --  I&O's Summary    14 Feb 2018 07:01  -  15 Feb 2018 07:00  --------------------------------------------------------  IN: 520 mL / OUT: 450 mL / NET: 70 mL        Appearance: Normal	  Cardiovascular: Normal S1 S2,RRR, No JVD, No murmurs  Respiratory: Lungs clear to auscultation	  Gastrointestinal:  distended , + slight tenderness , + BS	  Extremities: Normal range of motion, No clubbing, cyanosis or edema        MEDICATIONS  (STANDING):  ALBUTerol    90 MICROgram(s) HFA Inhaler 2 Puff(s) Inhalation every 6 hours  allopurinol 100 milliGRAM(s) Oral two times a day after meals  aspirin  chewable 81 milliGRAM(s) Oral daily  atorvastatin 20 milliGRAM(s) Oral at bedtime  calcitriol   Capsule 0.5 MICROGram(s) Oral daily  calcium acetate 667 milliGRAM(s) Oral three times a day with meals  carvedilol 12.5 milliGRAM(s) Oral every 12 hours  cholecalciferol 2000 Unit(s) Oral daily  citalopram 40 milliGRAM(s) Oral daily  docusate sodium 100 milliGRAM(s) Oral two times a day  folic acid 0.4 milliGRAM(s) Oral daily  furosemide    Tablet 40 milliGRAM(s) Oral every 12 hours  gemfibrozil 300 milliGRAM(s) Oral two times a day before meals  heparin  Injectable 5000 Unit(s) SubCutaneous every 12 hours  losartan 25 milliGRAM(s) Oral daily  magnesium oxide 400 milliGRAM(s) Oral two times a day with meals  pantoprazole    Tablet 40 milliGRAM(s) Oral two times a day before meals  pregabalin 75 milliGRAM(s) Oral daily  simethicone 80 milliGRAM(s) Chew two times a day  tiotropium 18 MICROgram(s) Capsule 1 Capsule(s) Inhalation daily      TELEMETRY: NSR   ECG:  	  RADIOLOGY:   DIAGNOSTIC TESTING:  [x ] Echocardiogram:   from: TTE with Doppler (w/Cont) (02.14.18 @ 11:14) >  CONCLUSIONS:  Technically very difficult study. The LV is seen in limited  views, and exclusively from the apical perspective.  1. Endocardium not well visualized; grossly normal left  ventricular systolic function. Endocardial visualization  enhanced with intravenous injection of echocontrast  (Definity). EF 55-60%.  2. Unable to comment on right ventricular or valvular  function.  ------------------------------------------------------------------------    < end of copied text >    [ ] Stress Test:    OTHER: 	  < from: Xray Chest 2 Views PA/Lat (02.13.18 @ 21:34) >    FINDINGS:     The cardiac silhouette is normal in size. There is no pleural effusion.   There is no pneumothorax. Right midlung linear atelectasis. There are   degenerative changes of the visualized thoracic spine. Thin radiopaque   linear object overlies the upper chest likely the band from a facemask,   given history.    IMPRESSION:   No acute pulmonary disease.          < end of copied text >    LABS:	 	        CKMB: 2.45 ng/mL (02-14 @ 09:44)                          9.8    15.93 )-----------( 194      ( 15 Feb 2018 06:00 )             28.7     02-15    136  |  92<L>  |  131<H>  ----------------------------<  112<H>  3.8   |  24  |  3.00<H>    Ca    9.8      15 Feb 2018 06:00  Phos  4.1     02-15  Mg     1.8     02-15    TPro  7.9  /  Alb  4.4  /  TBili  0.3  /  DBili  x   /  AST  23  /  ALT  11  /  AlkPhos  98  02-13

## 2018-02-15 NOTE — PROGRESS NOTE ADULT - SUBJECTIVE AND OBJECTIVE BOX
Patient is a 67y old  Male who presents with a chief complaint of chest pain (14 Feb 2018 02:20)      HPI:  68 y/o M with h/o HTN, HLD, CKD stage IV, CAD s/p 1 stent, AAA repair, COPD presents to the ED for chest pain X 1 day. Pt states he has had left sided constant chest pain with no radiation, nonexertional, nonpleuritic. Pt states the chest pain has currently resolved. Pt also states he suffers from chronic back pain since 1971 for years now. Pt denies LOC, syncope, fever, chills, palpitations shortness of breath, N/V/D/C, tingling, dysuria, urinary/bowel incontinence or any other complaints at this time. (14 Feb 2018 02:20)      PAST MEDICAL & SURGICAL HISTORY:  Sleep apnea  Chronic back pain  CKD (chronic kidney disease)  AAA (abdominal aortic aneurysm)  COPD bronchitis  Hyperlipidemia  Hypertension  Hernia  Gastric bypass status for obesity  Abdominal aortic aneurysm      MEDICATIONS  (STANDING):  allopurinol 100 milliGRAM(s) Oral two times a day after meals  aspirin  chewable 81 milliGRAM(s) Oral daily  atorvastatin 20 milliGRAM(s) Oral at bedtime  calcitriol   Capsule 0.5 MICROGram(s) Oral daily  carvedilol 12.5 milliGRAM(s) Oral every 12 hours  cholecalciferol 2000 Unit(s) Oral daily  citalopram 40 milliGRAM(s) Oral daily  docusate sodium 100 milliGRAM(s) Oral two times a day  folic acid 0.4 milliGRAM(s) Oral daily  gemfibrozil 300 milliGRAM(s) Oral two times a day before meals  heparin  Injectable 5000 Unit(s) SubCutaneous every 12 hours  pantoprazole    Tablet 40 milliGRAM(s) Oral two times a day before meals  sevelamer hydrochloride 800 milliGRAM(s) Oral three times a day with meals  simethicone 80 milliGRAM(s) Chew two times a day  tiotropium 18 MICROgram(s) Capsule 1 Capsule(s) Inhalation daily      Allergies    No Known Allergies    Intolerances        SOCIAL HISTORY:  Denies ETOh,Smoking,     FAMILY HISTORY:  No pertinent family history in first degree relatives      REVIEW OF SYSTEMS:    CONSTITUTIONAL: No weakness, fevers or chills  EYES/ENT: No visual changes;  No vertigo or throat pain   NECK: No pain or stiffness  RESPIRATORY: No cough, wheezing, hemoptysis; No shortness of breath  CARDIOVASCULAR: No chest pain or palpitations  GASTROINTESTINAL: No abdominal or epigastric pain. No nausea, vomiting, or hematemesis; No diarrhea or constipation. No melena or hematochezia.  GENITOURINARY: No dysuria, frequency or hematuria  NEUROLOGICAL: No numbness or weakness  SKIN: No itching, burning, rashes, or lesions   All other review of systems is negative unless indicated above.    VITAL:  T(C): , Max: 37.2 (02-15-18 @ 04:51)  T(F): , Max: 98.9 (02-15-18 @ 04:51)  HR: 97 (02-15-18 @ 12:33)  BP: 113/68 (02-15-18 @ 12:33)  BP(mean): --  RR: 16 (02-15-18 @ 12:33)  SpO2: 97% (02-15-18 @ 12:33)  Wt(kg): --    I and O's:    02-13 @ 07:01  -  02-14 @ 07:00  --------------------------------------------------------  IN: 180 mL / OUT: 850 mL / NET: -670 mL    02-14 @ 07:01  -  02-15 @ 07:00  --------------------------------------------------------  IN: 520 mL / OUT: 450 mL / NET: 70 mL    02-15 @ 07:01  -  02-15 @ 15:21  --------------------------------------------------------  IN: 570 mL / OUT: 0 mL / NET: 570 mL          PHYSICAL EXAM:    Constitutional: NAD  HEENT: PERRLA,   Neck: No JVD  Respiratory: CTA B/L  Cardiovascular: S1 and S2  Gastrointestinal: BS+, soft, NT/ND  Extremities: No peripheral edema  Neurological: A/O x 3, no focal deficits  Psychiatric: Normal mood, normal affect  : No Villalba  Skin: No rashes  Access: Not applicable  Back: No CVA tenderness    LABS:                        9.8    15.93 )-----------( 194      ( 15 Feb 2018 06:00 )             28.7     02-15    136  |  92<L>  |  131<H>  ----------------------------<  112<H>  3.8   |  24  |  3.00<H>    Ca    9.8      15 Feb 2018 06:00  Phos  4.1     02-15  Mg     1.8     02-15    TPro  7.9  /  Alb  4.4  /  TBili  0.3  /  DBili  x   /  AST  23  /  ALT  11  /  AlkPhos  98  02-13          RADIOLOGY & ADDITIONAL STUDIES:

## 2018-02-15 NOTE — PROGRESS NOTE ADULT - ASSESSMENT
68 y/o M with h/o HTN, HLD, CKD stage IV, CAD s/p 1 stent, AAA repair, COPD admitted with chest pain    1. Chest pain with atypical features   hx of cad/pci   pending stress test results   echo revealing grossly normal lv sys fx  cv stable no chest pain or sob    no events on tele   continue with low dose ASA     2. CKD   renal f.u    3. Abd discomfort   with diffuse abd tenderness on exam   + leukocytosis   cdiff neg  occult stool +   check abd xray   check BC   GI eval 68 y/o M with h/o HTN, HLD, CKD stage IV, CAD s/p 1 stent, AAA repair, COPD admitted with chest pain    1. Chest pain with atypical features   hx of cad/pci   pending stress test results   echo revealing grossly normal lv sys fx  cv stable no chest pain or sob    no events on tele   continue with low dose ASA     2. CKD   renal f.u    3. Abd discomfort   with diffuse abd tenderness on exam   + leukocytosis   cdiff neg  occult stool +   check abd xray   check Right upper abd sonogram   check BC / UA/ UC   GI eval 66 y/o M with h/o HTN, HLD, CKD stage IV, CAD s/p 1 stent, AAA repair, COPD admitted with chest pain    1. Chest pain with atypical features   hx of cad/pci   pending stress test results   echo revealing grossly normal lv sys fx  cv stable no chest pain or sob    no events on tele   continue with low dose ASA     2. CKD   renal f.u    3. Abd discomfort   with diffuse abd tenderness on exam   check EKG   + leukocytosis   cdiff neg  occult stool +   check abd xray   check Right upper abd sonogram   check BC / UA/ UC   GI eval 66 y/o M with h/o HTN, HLD, CKD stage IV, CAD s/p 1 stent, AAA repair, COPD admitted with chest pain    1. Chest pain with atypical features   hx of cad/pci   stress test with mild inferior ischemia with overall preserved EF  echo revealing grossly normal lv sys fx  continue with low dose ASA   will hold off further cardiac testing until gi work up complete    2. CKD   renal f.u    3. Abd discomfort, leukocytosis  with diffuse abd tenderness on exam   check EKG   + leukocytosis   cdiff neg  occult stool +   check abd xray   check Right upper abd sonogram   check BC / UA/ UC   GI eval

## 2018-02-15 NOTE — PROGRESS NOTE ADULT - ASSESSMENT
pt came in with chest pain but complaing of left and right lower abdominal pain and gas. agree wtih cardiology evaluation, ? ibs vs diverticultisi.  would do ct scan oral no iv contrast.  discylomine for spasm.  will follow with you.

## 2018-02-15 NOTE — PROGRESS NOTE ADULT - SUBJECTIVE AND OBJECTIVE BOX
[facility] NEPHROLOGY- PROGRESS NOTE    67 year old male with history of CHF presents with CP. Nephrology consulted for elevated Scr.    REVIEW OF SYSTEMS:  Gen: no changes in weight  Cards: no chest pain  Resp: no dyspnea  GI: no nausea or vomiting or diarrhea, + ab pain  Vascular: no LE edema    No Known Allergies      Hospital Medications: Medications reviewed    VITALS:  T(F): 98 (02-15-18 @ 12:33), Max: 98.9 (02-15-18 @ 04:51)  HR: 97 (02-15-18 @ 12:33)  BP: 113/68 (02-15-18 @ 12:33)  RR: 16 (02-15-18 @ 12:33)  SpO2: 97% (02-15-18 @ 12:33)  Wt(kg): --  Height (cm): 172.72 ( @ 23:51)  Weight (kg): 99 ( 23:51)  BMI (kg/m2): 33.2 ( 23:51)  BSA (m2): 2.12 ( 23:51)     @ 07:01  -  02-15 @ 07:00  --------------------------------------------------------  IN: 520 mL / OUT: 450 mL / NET: 70 mL    02-15 @ 07:01  -  02-15 @ 15:13  --------------------------------------------------------  IN: 570 mL / OUT: 0 mL / NET: 570 mL        PHYSICAL EXAM:    Gen: NAD, calm  Cards: RRR, +S1/S2, no M/G/R  Resp: CTA B/L  GI: soft, NT/ND, NABS  Vascular: no LE edema B/L    LABS:  02-15    136  |  92<L>  |  131<H>  ----------------------------<  112<H>  3.8   |  24  |  3.00<H>    Ca    9.8      15 Feb 2018 06:00  Phos  4.1     02-15  Mg     1.8     02-15    TPro  7.9  /  Alb  4.4  /  TBili  0.3  /  DBili      /  AST  23  /  ALT  11  /  AlkPhos  98  02-13    Creatinine Trend: 3.00 <--, 2.47 <--, 2.67 <--                        9.8    15.93 )-----------( 194      ( 15 Feb 2018 06:00 )             28.7     Urinalysis Basic - ( 15 Feb 2018 14:51 )    Color: YELLOW / Appearance: CLEAR / S.017 / pH: 5.5  Gluc: NEGATIVE / Ketone: NEGATIVE  / Bili: NEGATIVE / Urobili: NORMAL mg/dL   Blood: NEGATIVE / Protein: NEGATIVE mg/dL / Nitrite: NEGATIVE   Leuk Esterase: NEGATIVE / RBC: 0-2 / WBC 2-5   Sq Epi: OCC / Non Sq Epi:  / Bacteria: FEW    < from: US Abdomen Complete (02.15.18 @ 13:06) >  Right kidney: 11.8 cm. No hydronephrosis. 0.8 cm nonobstructing lower   pole stone. Several cysts for example in the interpolar region measuring   1.8 cm.    Left kidney: 12.4 cm.  No hydronephrosis. Multiple cysts, for example in   the lower pole measuring 2.6 cm.    < end of copied text >

## 2018-02-16 DIAGNOSIS — E87.1 HYPO-OSMOLALITY AND HYPONATREMIA: ICD-10-CM

## 2018-02-16 LAB
BACTERIA UR CULT: SIGNIFICANT CHANGE UP
BASOPHILS # BLD AUTO: 0.01 K/UL — SIGNIFICANT CHANGE UP (ref 0–0.2)
BASOPHILS NFR BLD AUTO: 0.1 % — SIGNIFICANT CHANGE UP (ref 0–2)
BUN SERPL-MCNC: 144 MG/DL — HIGH (ref 7–23)
CALCIUM SERPL-MCNC: 8.7 MG/DL — SIGNIFICANT CHANGE UP (ref 8.4–10.5)
CHLORIDE SERPL-SCNC: 89 MMOL/L — LOW (ref 98–107)
CO2 SERPL-SCNC: 21 MMOL/L — LOW (ref 22–31)
CREAT SERPL-MCNC: 3.64 MG/DL — HIGH (ref 0.5–1.3)
EOSINOPHIL # BLD AUTO: 0.14 K/UL — SIGNIFICANT CHANGE UP (ref 0–0.5)
EOSINOPHIL NFR BLD AUTO: 1.3 % — SIGNIFICANT CHANGE UP (ref 0–6)
GLUCOSE SERPL-MCNC: 106 MG/DL — HIGH (ref 70–99)
HCT VFR BLD CALC: 23.7 % — LOW (ref 39–50)
HCT VFR BLD CALC: 25.5 % — LOW (ref 39–50)
HGB BLD-MCNC: 7.9 G/DL — LOW (ref 13–17)
HGB BLD-MCNC: 8.6 G/DL — LOW (ref 13–17)
IMM GRANULOCYTES # BLD AUTO: 0.06 # — SIGNIFICANT CHANGE UP
IMM GRANULOCYTES NFR BLD AUTO: 0.5 % — SIGNIFICANT CHANGE UP (ref 0–1.5)
LYMPHOCYTES # BLD AUTO: 0.55 K/UL — LOW (ref 1–3.3)
LYMPHOCYTES # BLD AUTO: 4.9 % — LOW (ref 13–44)
MAGNESIUM SERPL-MCNC: 1.8 MG/DL — SIGNIFICANT CHANGE UP (ref 1.6–2.6)
MCHC RBC-ENTMCNC: 31.7 PG — SIGNIFICANT CHANGE UP (ref 27–34)
MCHC RBC-ENTMCNC: 32.3 PG — SIGNIFICANT CHANGE UP (ref 27–34)
MCHC RBC-ENTMCNC: 33.3 % — SIGNIFICANT CHANGE UP (ref 32–36)
MCHC RBC-ENTMCNC: 33.7 % — SIGNIFICANT CHANGE UP (ref 32–36)
MCV RBC AUTO: 95.2 FL — SIGNIFICANT CHANGE UP (ref 80–100)
MCV RBC AUTO: 95.9 FL — SIGNIFICANT CHANGE UP (ref 80–100)
MONOCYTES # BLD AUTO: 0.83 K/UL — SIGNIFICANT CHANGE UP (ref 0–0.9)
MONOCYTES NFR BLD AUTO: 7.4 % — SIGNIFICANT CHANGE UP (ref 2–14)
NEUTROPHILS # BLD AUTO: 9.56 K/UL — HIGH (ref 1.8–7.4)
NEUTROPHILS NFR BLD AUTO: 85.8 % — HIGH (ref 43–77)
NRBC # FLD: 0 — SIGNIFICANT CHANGE UP
NRBC # FLD: 0 — SIGNIFICANT CHANGE UP
PLATELET # BLD AUTO: 148 K/UL — LOW (ref 150–400)
PLATELET # BLD AUTO: 164 K/UL — SIGNIFICANT CHANGE UP (ref 150–400)
PMV BLD: 10 FL — SIGNIFICANT CHANGE UP (ref 7–13)
PMV BLD: 10.6 FL — SIGNIFICANT CHANGE UP (ref 7–13)
POTASSIUM SERPL-MCNC: 3.3 MMOL/L — LOW (ref 3.5–5.3)
POTASSIUM SERPL-SCNC: 3.3 MMOL/L — LOW (ref 3.5–5.3)
RBC # BLD: 2.49 M/UL — LOW (ref 4.2–5.8)
RBC # BLD: 2.66 M/UL — LOW (ref 4.2–5.8)
RBC # FLD: 14.4 % — SIGNIFICANT CHANGE UP (ref 10.3–14.5)
RBC # FLD: 14.6 % — HIGH (ref 10.3–14.5)
SODIUM SERPL-SCNC: 132 MMOL/L — LOW (ref 135–145)
SPECIMEN SOURCE: SIGNIFICANT CHANGE UP
SPECIMEN SOURCE: SIGNIFICANT CHANGE UP
WBC # BLD: 11.15 K/UL — HIGH (ref 3.8–10.5)
WBC # BLD: 13.86 K/UL — HIGH (ref 3.8–10.5)
WBC # FLD AUTO: 11.15 K/UL — HIGH (ref 3.8–10.5)
WBC # FLD AUTO: 13.86 K/UL — HIGH (ref 3.8–10.5)

## 2018-02-16 PROCEDURE — 78226 HEPATOBILIARY SYSTEM IMAGING: CPT | Mod: 26,GC

## 2018-02-16 RX ORDER — SODIUM CHLORIDE 9 MG/ML
1000 INJECTION INTRAMUSCULAR; INTRAVENOUS; SUBCUTANEOUS
Qty: 0 | Refills: 0 | Status: DISCONTINUED | OUTPATIENT
Start: 2018-02-16 | End: 2018-02-17

## 2018-02-16 RX ORDER — CIPROFLOXACIN LACTATE 400MG/40ML
200 VIAL (ML) INTRAVENOUS EVERY 24 HOURS
Qty: 0 | Refills: 0 | Status: DISCONTINUED | OUTPATIENT
Start: 2018-02-16 | End: 2018-02-19

## 2018-02-16 RX ORDER — POTASSIUM CHLORIDE 20 MEQ
20 PACKET (EA) ORAL
Qty: 0 | Refills: 0 | Status: COMPLETED | OUTPATIENT
Start: 2018-02-16 | End: 2018-02-16

## 2018-02-16 RX ORDER — METRONIDAZOLE 500 MG
500 TABLET ORAL EVERY 8 HOURS
Qty: 0 | Refills: 0 | Status: DISCONTINUED | OUTPATIENT
Start: 2018-02-16 | End: 2018-02-19

## 2018-02-16 RX ADMIN — PIPERACILLIN AND TAZOBACTAM 25 GRAM(S): 4; .5 INJECTION, POWDER, LYOPHILIZED, FOR SOLUTION INTRAVENOUS at 17:54

## 2018-02-16 RX ADMIN — HEPARIN SODIUM 5000 UNIT(S): 5000 INJECTION INTRAVENOUS; SUBCUTANEOUS at 17:52

## 2018-02-16 RX ADMIN — CARVEDILOL PHOSPHATE 12.5 MILLIGRAM(S): 80 CAPSULE, EXTENDED RELEASE ORAL at 05:08

## 2018-02-16 RX ADMIN — Medication 100 MILLIGRAM(S): at 17:52

## 2018-02-16 RX ADMIN — PIPERACILLIN AND TAZOBACTAM 25 GRAM(S): 4; .5 INJECTION, POWDER, LYOPHILIZED, FOR SOLUTION INTRAVENOUS at 05:10

## 2018-02-16 RX ADMIN — Medication 100 MILLIGRAM(S): at 21:35

## 2018-02-16 RX ADMIN — SEVELAMER CARBONATE 800 MILLIGRAM(S): 2400 POWDER, FOR SUSPENSION ORAL at 17:51

## 2018-02-16 RX ADMIN — Medication 20 MILLIGRAM(S): at 17:52

## 2018-02-16 RX ADMIN — SODIUM CHLORIDE 100 MILLILITER(S): 9 INJECTION INTRAMUSCULAR; INTRAVENOUS; SUBCUTANEOUS at 21:34

## 2018-02-16 RX ADMIN — Medication 300 MILLIGRAM(S): at 05:09

## 2018-02-16 RX ADMIN — TIOTROPIUM BROMIDE 1 CAPSULE(S): 18 CAPSULE ORAL; RESPIRATORY (INHALATION) at 12:24

## 2018-02-16 RX ADMIN — METHADONE HYDROCHLORIDE 10 MILLIGRAM(S): 40 TABLET ORAL at 12:24

## 2018-02-16 RX ADMIN — Medication 0.4 MILLIGRAM(S): at 12:25

## 2018-02-16 RX ADMIN — Medication 300 MILLIGRAM(S): at 17:52

## 2018-02-16 RX ADMIN — Medication 81 MILLIGRAM(S): at 12:25

## 2018-02-16 RX ADMIN — CALCITRIOL 0.5 MICROGRAM(S): 0.5 CAPSULE ORAL at 12:25

## 2018-02-16 RX ADMIN — Medication 100 MILLIGRAM(S): at 05:09

## 2018-02-16 RX ADMIN — CARVEDILOL PHOSPHATE 12.5 MILLIGRAM(S): 80 CAPSULE, EXTENDED RELEASE ORAL at 17:51

## 2018-02-16 RX ADMIN — Medication 20 MILLIEQUIVALENT(S): at 17:52

## 2018-02-16 RX ADMIN — Medication 100 MILLIGRAM(S): at 21:34

## 2018-02-16 RX ADMIN — Medication 20 MILLIGRAM(S): at 05:08

## 2018-02-16 RX ADMIN — SODIUM CHLORIDE 100 MILLILITER(S): 9 INJECTION INTRAMUSCULAR; INTRAVENOUS; SUBCUTANEOUS at 17:54

## 2018-02-16 RX ADMIN — SIMETHICONE 80 MILLIGRAM(S): 80 TABLET, CHEWABLE ORAL at 05:12

## 2018-02-16 RX ADMIN — PANTOPRAZOLE SODIUM 40 MILLIGRAM(S): 20 TABLET, DELAYED RELEASE ORAL at 05:10

## 2018-02-16 RX ADMIN — PANTOPRAZOLE SODIUM 40 MILLIGRAM(S): 20 TABLET, DELAYED RELEASE ORAL at 17:52

## 2018-02-16 RX ADMIN — Medication 20 MILLIEQUIVALENT(S): at 13:59

## 2018-02-16 RX ADMIN — Medication 2000 UNIT(S): at 12:24

## 2018-02-16 RX ADMIN — SEVELAMER CARBONATE 800 MILLIGRAM(S): 2400 POWDER, FOR SUSPENSION ORAL at 13:59

## 2018-02-16 RX ADMIN — SIMETHICONE 80 MILLIGRAM(S): 80 TABLET, CHEWABLE ORAL at 17:52

## 2018-02-16 RX ADMIN — METHADONE HYDROCHLORIDE 10 MILLIGRAM(S): 40 TABLET ORAL at 05:12

## 2018-02-16 RX ADMIN — HEPARIN SODIUM 5000 UNIT(S): 5000 INJECTION INTRAVENOUS; SUBCUTANEOUS at 05:10

## 2018-02-16 RX ADMIN — METHADONE HYDROCHLORIDE 10 MILLIGRAM(S): 40 TABLET ORAL at 17:52

## 2018-02-16 RX ADMIN — CITALOPRAM 40 MILLIGRAM(S): 10 TABLET, FILM COATED ORAL at 12:25

## 2018-02-16 RX ADMIN — ATORVASTATIN CALCIUM 20 MILLIGRAM(S): 80 TABLET, FILM COATED ORAL at 21:33

## 2018-02-16 NOTE — PROGRESS NOTE ADULT - SUBJECTIVE AND OBJECTIVE BOX
[facility] NEPHROLOGY- PROGRESS NOTE    67 year old male with history of CHF presents with CP. Nephrology consulted for elevated Scr.    REVIEW OF SYSTEMS:  Gen: no changes in weight  Cards: no chest pain  Resp: no dyspnea  GI: no nausea or vomiting or diarrhea, + ab pain  Vascular: no LE edema    No Known Allergies      Hospital Medications: Medications reviewed    VITALS:  T(F): 98 (18 @ 12:33), Max: 98.5 (02-15-18 @ 20:51)  HR: 65 (18 @ 17:07)  BP: 99/54 (18 @ 17:07)  RR: 18 (18 @ 17:07)  SpO2: 97% (18 @ 17:07)  Wt(kg): --    02-15 @ 07:01  -   @ 07:00  --------------------------------------------------------  IN: 870 mL / OUT: 0 mL / NET: 870 mL     @ 07:01  -   @ 17:33  --------------------------------------------------------  IN: 240 mL / OUT: 0 mL / NET: 240 mL      PHYSICAL EXAM:    Gen: NAD, calm  Cards: RRR, +S1/S2, no M/G/R  Resp: CTA B/L  GI: soft, NT/ND, NABS  Vascular: no LE edema B/L      LABS:      132<L>  |  89<L>  |  144<H>  ----------------------------<  106<H>  3.3<L>   |  21<L>  |  3.64<H>    Ca    8.7      2018 07:06  Phos  4.1     -15  Mg     1.8         TPro  7.4  /  Alb  4.4  /  TBili  0.4  /  DBili  0.1  /  AST  16  /  ALT  6   /  AlkPhos  101  02-15    Creatinine Trend: 3.64 <--, 3.00 <--, 2.47 <--, 2.67 <--                        8.6    13.86 )-----------( 164      ( 2018 08:06 )             25.5     Urine Studies:  Urinalysis Basic - ( 15 Feb 2018 14:51 )    Color: YELLOW / Appearance: CLEAR / S.017 / pH: 5.5  Gluc: NEGATIVE / Ketone: NEGATIVE  / Bili: NEGATIVE / Urobili: NORMAL mg/dL   Blood: NEGATIVE / Protein: NEGATIVE mg/dL / Nitrite: NEGATIVE   Leuk Esterase: NEGATIVE / RBC: 0-2 / WBC 2-5   Sq Epi: OCC / Non Sq Epi:  / Bacteria: FEW      Creatinine, Random Urine: 134.13 mg/dL (02-15 @ 20:38)

## 2018-02-16 NOTE — PROGRESS NOTE ADULT - SUBJECTIVE AND OBJECTIVE BOX
ROSE CARLTON:[id number],   67yMale followed for: abdominal pain  No Known Allergies    PAST MEDICAL & SURGICAL HISTORY:  Sleep apnea  Chronic back pain  CKD (chronic kidney disease)  AAA (abdominal aortic aneurysm)  COPD bronchitis  Hyperlipidemia  Hypertension  Hernia  Gastric bypass status for obesity  Abdominal aortic aneurysm    FAMILY HISTORY:  No pertinent family history in first degree relatives    MEDICATIONS  (STANDING):  allopurinol 100 milliGRAM(s) Oral two times a day after meals  aspirin  chewable 81 milliGRAM(s) Oral daily  atorvastatin 20 milliGRAM(s) Oral at bedtime  calcitriol   Capsule 0.5 MICROGram(s) Oral daily  carvedilol 12.5 milliGRAM(s) Oral every 12 hours  cholecalciferol 2000 Unit(s) Oral daily  citalopram 40 milliGRAM(s) Oral daily  dicyclomine 20 milliGRAM(s) Oral two times a day before meals  docusate sodium 100 milliGRAM(s) Oral two times a day  folic acid 0.4 milliGRAM(s) Oral daily  gemfibrozil 300 milliGRAM(s) Oral two times a day before meals  heparin  Injectable 5000 Unit(s) SubCutaneous every 12 hours  pantoprazole    Tablet 40 milliGRAM(s) Oral two times a day before meals  piperacillin/tazobactam IVPB. 3.375 Gram(s) IV Intermittent every 12 hours  sevelamer hydrochloride 800 milliGRAM(s) Oral three times a day with meals  simethicone 80 milliGRAM(s) Chew two times a day  tiotropium 18 MICROgram(s) Capsule 1 Capsule(s) Inhalation daily    MEDICATIONS  (PRN):  ALBUTerol    90 MICROgram(s) HFA Inhaler 2 Puff(s) Inhalation every 6 hours PRN Cough, Wheezing  methadone    Tablet 10 milliGRAM(s) Oral five times a day PRN severe pain  oxyCODONE    5 mG/acetaminophen 325 mG 2 Tablet(s) Oral every 6 hours PRN moderate, severe pain      Vital Signs Last 24 Hrs  T(C): 36.5 (16 Feb 2018 05:02), Max: 36.9 (15 Feb 2018 20:51)  T(F): 97.7 (16 Feb 2018 05:02), Max: 98.5 (15 Feb 2018 20:51)  HR: 60 (16 Feb 2018 05:02) (60 - 97)  BP: 118/59 (16 Feb 2018 05:02) (100/64 - 118/59)  BP(mean): --  RR: 12 (16 Feb 2018 05:02) (12 - 16)  SpO2: 95% (16 Feb 2018 05:02) (95% - 97%)  nc/at  s1s2  cta  soft, nt, nd no guarding or rebound  no c/c/e    CBC Full  -  ( 15 Feb 2018 06:00 )  WBC Count : 15.93 K/uL  Hemoglobin : 9.8 g/dL  Hematocrit : 28.7 %  Platelet Count - Automated : 194 K/uL  Mean Cell Volume : 98.0 fL  Mean Cell Hemoglobin : 33.4 pg  Mean Cell Hemoglobin Concentration : 34.1 %  Auto Neutrophil # : 14.08 K/uL  Auto Lymphocyte # : 0.82 K/uL  Auto Monocyte # : 0.86 K/uL  Auto Eosinophil # : 0.08 K/uL  Auto Basophil # : 0.02 K/uL  Auto Neutrophil % : 88.5 %  Auto Lymphocyte % : 5.1 %  Auto Monocyte % : 5.4 %  Auto Eosinophil % : 0.5 %  Auto Basophil % : 0.1 %    02-15    136  |  92<L>  |  131<H>  ----------------------------<  112<H>  3.8   |  24  |  3.00<H>    Ca    9.8      15 Feb 2018 06:00  Phos  4.1     02-15  Mg     1.8     02-15    TPro  7.4  /  Alb  4.4  /  TBili  0.4  /  DBili  0.1  /  AST  16  /  ALT  6   /  AlkPhos  101  02-15

## 2018-02-16 NOTE — PROGRESS NOTE ADULT - ASSESSMENT
67M with transverse colon colitis    - Cont Abx  - Care per primary team  - No acute surgical issues. We will sign off. Please reconsult at any time.

## 2018-02-16 NOTE — PROGRESS NOTE ADULT - ASSESSMENT
66 y/o M with h/o HTN, HLD, CKD stage IV, CAD s/p 1 stent, AAA repair, COPD admitted with chest pain    1. Chest pain with atypical features   hx of cad/pci   stress test with mild inferior ischemia with overall preserved EF  echo revealing grossly normal lv sys fx  continue with low dose ASA   will hold off further cardiac testing until gi work up complete    2. CKD   renal f.u    3. Abd discomfort, leukocytosis  with diffuse abd tenderness on exam   EKG unchanged   cdiff neg  occult stool +   BC / UA negative   abd xray revealing non obstructing renal calculus   Abd US revealing distended gallbladder with gallstones   HIDA scan revealing  no evidence of acute cholecystis   IV abx   per surgery no sx intervention   GI f/u     dvt ppx

## 2018-02-16 NOTE — PROGRESS NOTE ADULT - ASSESSMENT
patient with stranding around colon on ct scan.  ? colitis.  will start cipro/ flagyl emeric rx.  awiat hida per surgeyr.

## 2018-02-16 NOTE — PROGRESS NOTE ADULT - ASSESSMENT
66 y/o M with h/o HTN, HLD, CKD stage IV, CAD s/p 1 stent, AAA repair, COPD presents to the ED for chest pain X 1 day. Pt states he has had left sided constant chest pain with no radiation, nonexertional, nonpleuritic. Pt states the chest pain has currently resolved. Pt also states he suffers from chronic back pain since 1971 for years now. Pt denies LOC, syncope, fever, chills, palpitations shortness of breath, N/V/D/C, tingling, dysuria, urinary/bowel incontinence or any other complaints at this time. (14 Feb 2018 02:20)    Pt c/o gas like lower abd pain for past 2-3 days.  He denies fevers/chills/rigors/N/V/diarrhea.  He has history of gallstones, and opted not to remove his GB during his gastric bypass surgery.  WBC increased today.  Abd US with gallstones and distention.      Problem/Plan - 1:  ·	Leukocytosis    - In the setting of abdominal pain.  Abd US with gallstones and distention.  Pt with h/o of cholelithiasis in the past.  No fever or elevated LFTs.      - HIDA scan negative for cholecystitis    - CTAP shows colitis involving transverse colon.  Cdiff negative.  Send stool cultures, O&P, GI pcr    - Will change abx to cipro/flagyl    - Monitor WBC    D/W wife at bedside.    Belinda Rohan  [phone]

## 2018-02-16 NOTE — PROGRESS NOTE ADULT - SUBJECTIVE AND OBJECTIVE BOX
CARDIOLOGY FOLLOW UP - Dr. Serra    CC no chest pain or sob  no change of abd pain       PHYSICAL EXAM:  T(C): 36.7 (02-16-18 @ 12:33), Max: 36.9 (02-15-18 @ 20:51)  HR: 62 (02-16-18 @ 12:33) (60 - 63)  BP: 127/61 (02-16-18 @ 12:33) (100/64 - 127/61)  RR: 16 (02-16-18 @ 12:33) (12 - 16)  SpO2: 97% (02-16-18 @ 12:33) (95% - 97%)  Wt(kg): --  I&O's Summary    15 Feb 2018 07:01  -  16 Feb 2018 07:00  --------------------------------------------------------  IN: 870 mL / OUT: 0 mL / NET: 870 mL        Appearance: Normal	  Cardiovascular: Normal S1 S2,RRR, No JVD, No murmurs  Respiratory: Lungs clear to auscultation	  Gastrointestinal:  slight distention, mild-tenderness, + BS	  Extremities: Normal range of motion, No clubbing, cyanosis or edema        MEDICATIONS  (STANDING):  allopurinol 100 milliGRAM(s) Oral two times a day after meals  aspirin  chewable 81 milliGRAM(s) Oral daily  atorvastatin 20 milliGRAM(s) Oral at bedtime  calcitriol   Capsule 0.5 MICROGram(s) Oral daily  carvedilol 12.5 milliGRAM(s) Oral every 12 hours  cholecalciferol 2000 Unit(s) Oral daily  citalopram 40 milliGRAM(s) Oral daily  dicyclomine 20 milliGRAM(s) Oral two times a day before meals  docusate sodium 100 milliGRAM(s) Oral two times a day  folic acid 0.4 milliGRAM(s) Oral daily  gemfibrozil 300 milliGRAM(s) Oral two times a day before meals  heparin  Injectable 5000 Unit(s) SubCutaneous every 12 hours  pantoprazole    Tablet 40 milliGRAM(s) Oral two times a day before meals  piperacillin/tazobactam IVPB. 3.375 Gram(s) IV Intermittent every 12 hours  potassium chloride    Tablet ER 20 milliEquivalent(s) Oral every 2 hours  sevelamer hydrochloride 800 milliGRAM(s) Oral three times a day with meals  simethicone 80 milliGRAM(s) Chew two times a day  tiotropium 18 MICROgram(s) Capsule 1 Capsule(s) Inhalation daily      TELEMETRY: NSR 	    ECG:  	  RADIOLOGY:   DIAGNOSTIC TESTING:  [x ] Echocardiogram:   from: TTE with Doppler (w/Cont) (02.14.18 @ 11:14) >  CONCLUSIONS:  Technically very difficult study. The LV is seen in limited  views, and exclusively from the apical perspective.  1. Endocardium not well visualized; grossly normal left  ventricular systolic function. Endocardial visualization  enhanced with intravenous injection of echocontrast  (Definity). EF 55-60%.  2. Unable to comment on right ventricular or valvular  function.  ------------------------------------------------------------------------    < end of copied text >    [ ]  Catheterization:  [ ] Stress Test:    OTHER: 	    < from: Xray Abdomen 2 Views (02.15.18 @ 10:39) >      IMPRESSION:   5 mm radiopaque density overlying the lower pole of right kidney may be a   nonobstructing renal calculus. Correlate with pending ultrasound of the   abdomen.  Nonobstructive bowel gas pattern.    --------------------------------------------------------------------------------------------------    < from: US Abdomen Complete (02.15.18 @ 13:06) >    IMPRESSION:     Distended gallbladder with gallstones. Consider a HIDA scan for further   evaluation.    Nonobstructing right kidney stone.      --------------------------------------------------------------------------------------------------    < from: NM Hepatobiliary Scan w/wo Gall Bladder (02.16.18 @ 11:39) >    IMPRESSION: Normal hepatobiliary scan. No radionuclide evidence of acute   cholecystitis.        < end of copied text >            LABS:	 	  CKMB: 1.82 ng/mL (02-15 @ 19:05)                          8.6    13.86 )-----------( 164      ( 16 Feb 2018 08:06 )             25.5     02-16    132<L>  |  89<L>  |  144<H>  ----------------------------<  106<H>  3.3<L>   |  21<L>  |  3.64<H>    Ca    8.7      16 Feb 2018 07:06  Phos  4.1     02-15  Mg     1.8     02-16    TPro  7.4  /  Alb  4.4  /  TBili  0.4  /  DBili  0.1  /  AST  16  /  ALT  6   /  AlkPhos  101  02-15

## 2018-02-16 NOTE — PROGRESS NOTE ADULT - PROBLEM SELECTOR PLAN 6
Hb borderline with low TSAT. Will give venofer 200 mg IV 2/2 today. Monitor Hb. Hb borderline with low TSAT s/p venofer 200 mg IV X 1 dose on 2/15. Monitor Hb. Hb borderline with low TSAT s/p venofer 200 mg IV X 1 dose on 2/15. Eventual DAVID. Monitor Hb.

## 2018-02-16 NOTE — PROGRESS NOTE ADULT - SUBJECTIVE AND OBJECTIVE BOX
INTERVAL HPI/OVERNIGHT EVENTS: Pt seen and examined. HIDA scan was negative. Complains of band-like hypoumbilical pain. No bowel movement. Denies fever    MEDICATIONS  (STANDING):  allopurinol 100 milliGRAM(s) Oral two times a day after meals  aspirin  chewable 81 milliGRAM(s) Oral daily  atorvastatin 20 milliGRAM(s) Oral at bedtime  calcitriol   Capsule 0.5 MICROGram(s) Oral daily  carvedilol 12.5 milliGRAM(s) Oral every 12 hours  cholecalciferol 2000 Unit(s) Oral daily  citalopram 40 milliGRAM(s) Oral daily  dicyclomine 20 milliGRAM(s) Oral two times a day before meals  docusate sodium 100 milliGRAM(s) Oral two times a day  folic acid 0.4 milliGRAM(s) Oral daily  gemfibrozil 300 milliGRAM(s) Oral two times a day before meals  heparin  Injectable 5000 Unit(s) SubCutaneous every 12 hours  pantoprazole    Tablet 40 milliGRAM(s) Oral two times a day before meals  piperacillin/tazobactam IVPB. 3.375 Gram(s) IV Intermittent every 12 hours  potassium chloride    Tablet ER 20 milliEquivalent(s) Oral every 2 hours  sevelamer hydrochloride 800 milliGRAM(s) Oral three times a day with meals  simethicone 80 milliGRAM(s) Chew two times a day  tiotropium 18 MICROgram(s) Capsule 1 Capsule(s) Inhalation daily    MEDICATIONS  (PRN):  ALBUTerol    90 MICROgram(s) HFA Inhaler 2 Puff(s) Inhalation every 6 hours PRN Cough, Wheezing  methadone    Tablet 10 milliGRAM(s) Oral five times a day PRN severe pain  oxyCODONE    5 mG/acetaminophen 325 mG 2 Tablet(s) Oral every 6 hours PRN moderate, severe pain      Vital Signs Last 24 Hrs  T(C): 36.7 (2018 12:33), Max: 36.9 (15 Feb 2018 20:51)  T(F): 98 (2018 12:33), Max: 98.5 (15 Feb 2018 20:51)  HR: 62 (2018 12:33) (60 - 63)  BP: 127/61 (2018 12:33) (100/64 - 127/61)  BP(mean): --  RR: 16 (2018 12:33) (12 - 16)  SpO2: 97% (2018 12:33) (95% - 97%)    PHYSICAL EXAM:      Constitutional: NAD    Gastrointestinal: Abd soft, ND, tender to hypo-umbilical palpation        I&O's Detail    15 Feb 2018 07:01  -  2018 07:00  --------------------------------------------------------  IN:    IV PiggyBack: 200 mL    Oral Fluid: 670 mL  Total IN: 870 mL    OUT:  Total OUT: 0 mL    Total NET: 870 mL          LABS:                        8.6    13.86 )-----------( 164      ( 2018 08:06 )             25.5     02-16    132<L>  |  89<L>  |  144<H>  ----------------------------<  106<H>  3.3<L>   |  21<L>  |  3.64<H>    Ca    8.7      2018 07:06  Phos  4.1     02-15  Mg     1.8     -16    TPro  7.4  /  Alb  4.4  /  TBili  0.4  /  DBili  0.1  /  AST  16  /  ALT  6   /  AlkPhos  101  02-15      Urinalysis Basic - ( 15 Feb 2018 14:51 )    Color: YELLOW / Appearance: CLEAR / S.017 / pH: 5.5  Gluc: NEGATIVE / Ketone: NEGATIVE  / Bili: NEGATIVE / Urobili: NORMAL mg/dL   Blood: NEGATIVE / Protein: NEGATIVE mg/dL / Nitrite: NEGATIVE   Leuk Esterase: NEGATIVE / RBC: 0-2 / WBC 2-5   Sq Epi: OCC / Non Sq Epi: x / Bacteria: FEW        RADIOLOGY & ADDITIONAL STUDIES:

## 2018-02-16 NOTE — PROGRESS NOTE ADULT - SUBJECTIVE AND OBJECTIVE BOX
Infectious Diseases progress note:    Subjective: Events noted.  Frequency of BMs and gas improved today.  Still with lower abd pain.  No fevers/chills/N/V.  tolerating PO.  Cdiff negative.  HIDA scan negative for cholecystitis.    ROS:  CONSTITUTIONAL:  No fever, chills, rigors  CARDIOVASCULAR:  No chest pain or palpitations  RESPIRATORY:   No SOB, cough, dyspnea on exertion.  No wheezing  GASTROINTESTINAL:  No abd pain, N/V, diarrhea/constipation  EXTREMITIES:  No swelling or joint pain  GENITOURINARY:  No burning on urination, increased frequency or urgency.  No flank pain  NEUROLOGIC:  No HA, visual disturbances  SKIN: No rashes    Allergies    No Known Allergies    Intolerances        ANTIBIOTICS/RELEVANT:  antimicrobials  piperacillin/tazobactam IVPB. 3.375 Gram(s) IV Intermittent every 12 hours    immunologic:    OTHER:  ALBUTerol    90 MICROgram(s) HFA Inhaler 2 Puff(s) Inhalation every 6 hours PRN  allopurinol 100 milliGRAM(s) Oral two times a day after meals  aspirin  chewable 81 milliGRAM(s) Oral daily  atorvastatin 20 milliGRAM(s) Oral at bedtime  calcitriol   Capsule 0.5 MICROGram(s) Oral daily  carvedilol 12.5 milliGRAM(s) Oral every 12 hours  cholecalciferol 2000 Unit(s) Oral daily  citalopram 40 milliGRAM(s) Oral daily  dicyclomine 20 milliGRAM(s) Oral two times a day before meals  docusate sodium 100 milliGRAM(s) Oral two times a day  folic acid 0.4 milliGRAM(s) Oral daily  gemfibrozil 300 milliGRAM(s) Oral two times a day before meals  heparin  Injectable 5000 Unit(s) SubCutaneous every 12 hours  methadone    Tablet 10 milliGRAM(s) Oral five times a day PRN  oxyCODONE    5 mG/acetaminophen 325 mG 2 Tablet(s) Oral every 6 hours PRN  pantoprazole    Tablet 40 milliGRAM(s) Oral two times a day before meals  sevelamer hydrochloride 800 milliGRAM(s) Oral three times a day with meals  simethicone 80 milliGRAM(s) Chew two times a day  sodium chloride 0.9%. 1000 milliLiter(s) IV Continuous <Continuous>  tiotropium 18 MICROgram(s) Capsule 1 Capsule(s) Inhalation daily      Objective:  Vital Signs Last 24 Hrs  T(C): 36.7 (2018 12:33), Max: 36.9 (15 Feb 2018 20:51)  T(F): 98 (2018 12:33), Max: 98.5 (15 Feb 2018 20:51)  HR: 65 (2018 17:48) (60 - 65)  BP: 100/48 (2018 17:48) (99/54 - 127/61)  BP(mean): --  RR: 18 (2018 17:48) (12 - 18)  SpO2: 98% (2018 17:48) (95% - 98%)    PHYSICAL EXAM:  Constitutional:NAD, obese  Eyes:PONCHO, EOMI  Ear/Nose/Throat: no thrush, mucositis.  Moist mucous membranes	  Neck:no JVD, no lymphadenopathy, supple  Respiratory: CTA olimpia  Cardiovascular: S1S2 RRR, no murmurs  Gastrointestinal:soft, nontender,  nondistended (+) BS  Extremities:no e/e/c  Skin:  no rashes, open wounds or ulcerations        LABS:                        8.6    13.86 )-----------( 164      ( 2018 08:06 )             25.5     02-16    132<L>  |  89<L>  |  144<H>  ----------------------------<  106<H>  3.3<L>   |  21<L>  |  3.64<H>    Ca    8.7      2018 07:06  Phos  4.1     -15  Mg     1.8     -16    TPro  7.4  /  Alb  4.4  /  TBili  0.4  /  DBili  0.1  /  AST  16  /  ALT  6   /  AlkPhos  101  02-15      Urinalysis Basic - ( 15 Feb 2018 14:51 )    Color: YELLOW / Appearance: CLEAR / S.017 / pH: 5.5  Gluc: NEGATIVE / Ketone: NEGATIVE  / Bili: NEGATIVE / Urobili: NORMAL mg/dL   Blood: NEGATIVE / Protein: NEGATIVE mg/dL / Nitrite: NEGATIVE   Leuk Esterase: NEGATIVE / RBC: 0-2 / WBC 2-5   Sq Epi: OCC / Non Sq Epi: x / Bacteria: FEW            MICROBIOLOGY:    Culture - Urine (02.15.18 @ 15:45)    Culture - Urine:   NO GROWTH AT 24 HOURS    Specimen Source: URINE MIDSTREAM    Culture - Blood (02.15.18 @ 12:14)    Culture - Blood:   NO ORGANISMS ISOLATED  NO ORGANISMS ISOLATED AT 24 HOURS    Specimen Source: BLOOD PERIPHERAL          RADIOLOGY & ADDITIONAL STUDIES:    < from: NM Hepatobiliary Scan w/wo Gall Bladder (18 @ 11:39) >  FINDINGS: There is prompt, homogeneous uptake of radiopharmaceutical by   the hepatocytes. Activity is first seen in the gallbladder at about 85   minutes and in the bowel at about 25 minutes. There is good clearance of   activity from the liver by the end of the study.     Bowel activity present on the initial images is secondary to a previous   nuclear medicine procedure.    IMPRESSION: Normal hepatobiliary scan. No radionuclide evidence of acute   cholecystitis.    < end of copied text >      < from: CT Abdomen and Pelvis w/ Oral Cont (02.15.18 @ 20:09) >  FINDINGS:    LOWER CHEST: Within normal limits.    LIVER: Within normal limits.  BILE DUCTS: Normal caliber.  GALLBLADDER: Within normal limits.  SPLEEN: Within normal limits.  PANCREAS: Within normal limits.  ADRENALS: Within normal limits.  KIDNEYS/URETERS: Bilateral renal cysts. No hydronephrosis. Indeterminate   left renal lesion measuring 2.9 x 3.2 cm, which may represent a   proteinaceous or hemorrhagic cyst.    BLADDER: Within normal limits.  REPRODUCTIVE ORGANS: The prostate is within normal limits.    BOWEL: No bowel obstruction. Appendix is normal. Mural thickening and   pericolonic fat stranding involving the transverse colon consistent with   focal colitis. Status post gastric bypass.   PERITONEUM: No ascites.  VESSELS:  Atherosclerotic changes. Aortoiliac vascular stents visualized.  RETROPERITONEUM: No lymphadenopathy.    ABDOMINAL WALL: Small fat-containing umbilical hernia.  BONES: Multilevel degenerative changes.    IMPRESSION: Colitis involving transverse colon.    < end of copied text >

## 2018-02-17 LAB
ALBUMIN SERPL ELPH-MCNC: 3.5 G/DL — SIGNIFICANT CHANGE UP (ref 3.3–5)
ALP SERPL-CCNC: 71 U/L — SIGNIFICANT CHANGE UP (ref 40–120)
ALT FLD-CCNC: 7 U/L — SIGNIFICANT CHANGE UP (ref 4–41)
AST SERPL-CCNC: 12 U/L — SIGNIFICANT CHANGE UP (ref 4–40)
BILIRUB SERPL-MCNC: 0.3 MG/DL — SIGNIFICANT CHANGE UP (ref 0.2–1.2)
BUN SERPL-MCNC: 136 MG/DL — HIGH (ref 7–23)
CALCIUM SERPL-MCNC: 9.4 MG/DL — SIGNIFICANT CHANGE UP (ref 8.4–10.5)
CHLORIDE SERPL-SCNC: 94 MMOL/L — LOW (ref 98–107)
CO2 SERPL-SCNC: 20 MMOL/L — LOW (ref 22–31)
CREAT SERPL-MCNC: 3.46 MG/DL — HIGH (ref 0.5–1.3)
GLUCOSE SERPL-MCNC: 96 MG/DL — SIGNIFICANT CHANGE UP (ref 70–99)
HCT VFR BLD CALC: 22.5 % — LOW (ref 39–50)
HGB BLD-MCNC: 7.7 G/DL — LOW (ref 13–17)
MAGNESIUM SERPL-MCNC: 1.9 MG/DL — SIGNIFICANT CHANGE UP (ref 1.6–2.6)
MCHC RBC-ENTMCNC: 33.6 PG — SIGNIFICANT CHANGE UP (ref 27–34)
MCHC RBC-ENTMCNC: 34.2 % — SIGNIFICANT CHANGE UP (ref 32–36)
MCV RBC AUTO: 98.3 FL — SIGNIFICANT CHANGE UP (ref 80–100)
NRBC # FLD: 0 — SIGNIFICANT CHANGE UP
PHOSPHATE SERPL-MCNC: 4.5 MG/DL — SIGNIFICANT CHANGE UP (ref 2.5–4.5)
PLATELET # BLD AUTO: 144 K/UL — LOW (ref 150–400)
PMV BLD: 10.5 FL — SIGNIFICANT CHANGE UP (ref 7–13)
POTASSIUM SERPL-MCNC: 3.4 MMOL/L — LOW (ref 3.5–5.3)
POTASSIUM SERPL-SCNC: 3.4 MMOL/L — LOW (ref 3.5–5.3)
PROT SERPL-MCNC: 6.4 G/DL — SIGNIFICANT CHANGE UP (ref 6–8.3)
RBC # BLD: 2.29 M/UL — LOW (ref 4.2–5.8)
RBC # FLD: 14.6 % — HIGH (ref 10.3–14.5)
SODIUM SERPL-SCNC: 134 MMOL/L — LOW (ref 135–145)
WBC # BLD: 9.14 K/UL — SIGNIFICANT CHANGE UP (ref 3.8–10.5)
WBC # FLD AUTO: 9.14 K/UL — SIGNIFICANT CHANGE UP (ref 3.8–10.5)

## 2018-02-17 RX ORDER — POTASSIUM CHLORIDE 20 MEQ
40 PACKET (EA) ORAL ONCE
Qty: 0 | Refills: 0 | Status: COMPLETED | OUTPATIENT
Start: 2018-02-17 | End: 2018-02-17

## 2018-02-17 RX ORDER — SODIUM CHLORIDE 9 MG/ML
1000 INJECTION INTRAMUSCULAR; INTRAVENOUS; SUBCUTANEOUS
Qty: 0 | Refills: 0 | Status: DISCONTINUED | OUTPATIENT
Start: 2018-02-17 | End: 2018-02-18

## 2018-02-17 RX ORDER — POTASSIUM CHLORIDE 20 MEQ
40 PACKET (EA) ORAL ONCE
Qty: 0 | Refills: 0 | Status: DISCONTINUED | OUTPATIENT
Start: 2018-02-17 | End: 2018-02-17

## 2018-02-17 RX ADMIN — Medication 100 MILLIGRAM(S): at 17:06

## 2018-02-17 RX ADMIN — HEPARIN SODIUM 5000 UNIT(S): 5000 INJECTION INTRAVENOUS; SUBCUTANEOUS at 05:05

## 2018-02-17 RX ADMIN — METHADONE HYDROCHLORIDE 10 MILLIGRAM(S): 40 TABLET ORAL at 22:26

## 2018-02-17 RX ADMIN — Medication 100 MILLIGRAM(S): at 05:06

## 2018-02-17 RX ADMIN — Medication 20 MILLIGRAM(S): at 17:06

## 2018-02-17 RX ADMIN — Medication 100 MILLIGRAM(S): at 05:05

## 2018-02-17 RX ADMIN — Medication 100 MILLIGRAM(S): at 22:26

## 2018-02-17 RX ADMIN — Medication 20 MILLIGRAM(S): at 05:05

## 2018-02-17 RX ADMIN — SEVELAMER CARBONATE 800 MILLIGRAM(S): 2400 POWDER, FOR SUSPENSION ORAL at 17:06

## 2018-02-17 RX ADMIN — Medication 40 MILLIEQUIVALENT(S): at 09:54

## 2018-02-17 RX ADMIN — PANTOPRAZOLE SODIUM 40 MILLIGRAM(S): 20 TABLET, DELAYED RELEASE ORAL at 05:06

## 2018-02-17 RX ADMIN — METHADONE HYDROCHLORIDE 10 MILLIGRAM(S): 40 TABLET ORAL at 05:04

## 2018-02-17 RX ADMIN — SODIUM CHLORIDE 100 MILLILITER(S): 9 INJECTION INTRAMUSCULAR; INTRAVENOUS; SUBCUTANEOUS at 12:16

## 2018-02-17 RX ADMIN — Medication 300 MILLIGRAM(S): at 17:06

## 2018-02-17 RX ADMIN — Medication 81 MILLIGRAM(S): at 11:54

## 2018-02-17 RX ADMIN — CARVEDILOL PHOSPHATE 12.5 MILLIGRAM(S): 80 CAPSULE, EXTENDED RELEASE ORAL at 05:04

## 2018-02-17 RX ADMIN — ATORVASTATIN CALCIUM 20 MILLIGRAM(S): 80 TABLET, FILM COATED ORAL at 22:26

## 2018-02-17 RX ADMIN — CALCITRIOL 0.5 MICROGRAM(S): 0.5 CAPSULE ORAL at 11:54

## 2018-02-17 RX ADMIN — HEPARIN SODIUM 5000 UNIT(S): 5000 INJECTION INTRAVENOUS; SUBCUTANEOUS at 17:06

## 2018-02-17 RX ADMIN — SEVELAMER CARBONATE 800 MILLIGRAM(S): 2400 POWDER, FOR SUSPENSION ORAL at 09:54

## 2018-02-17 RX ADMIN — CITALOPRAM 40 MILLIGRAM(S): 10 TABLET, FILM COATED ORAL at 11:54

## 2018-02-17 RX ADMIN — SIMETHICONE 80 MILLIGRAM(S): 80 TABLET, CHEWABLE ORAL at 05:06

## 2018-02-17 RX ADMIN — SIMETHICONE 80 MILLIGRAM(S): 80 TABLET, CHEWABLE ORAL at 17:06

## 2018-02-17 RX ADMIN — CARVEDILOL PHOSPHATE 12.5 MILLIGRAM(S): 80 CAPSULE, EXTENDED RELEASE ORAL at 17:06

## 2018-02-17 RX ADMIN — Medication 300 MILLIGRAM(S): at 05:05

## 2018-02-17 RX ADMIN — Medication 0.4 MILLIGRAM(S): at 11:54

## 2018-02-17 RX ADMIN — PANTOPRAZOLE SODIUM 40 MILLIGRAM(S): 20 TABLET, DELAYED RELEASE ORAL at 17:07

## 2018-02-17 RX ADMIN — Medication 100 MILLIGRAM(S): at 09:54

## 2018-02-17 RX ADMIN — Medication 100 MILLIGRAM(S): at 13:55

## 2018-02-17 RX ADMIN — TIOTROPIUM BROMIDE 1 CAPSULE(S): 18 CAPSULE ORAL; RESPIRATORY (INHALATION) at 09:54

## 2018-02-17 RX ADMIN — Medication 2000 UNIT(S): at 11:54

## 2018-02-17 RX ADMIN — SEVELAMER CARBONATE 800 MILLIGRAM(S): 2400 POWDER, FOR SUSPENSION ORAL at 11:54

## 2018-02-17 NOTE — PROGRESS NOTE ADULT - SUBJECTIVE AND OBJECTIVE BOX
CC: c/o lower abdominal pain, no cp/sob    TELEMETRY: NSR    PHYSICAL EXAM:    T(C): 36.9 (02-17-18 @ 04:54), Max: 36.9 (02-17-18 @ 04:54)  HR: 62 (02-17-18 @ 04:54) (61 - 65)  BP: 122/68 (02-17-18 @ 04:54) (99/54 - 127/61)  RR: 23 (02-17-18 @ 04:54) (14 - 23)  SpO2: 97% (02-17-18 @ 04:54) (97% - 98%)  Wt(kg): --  I&O's Summary    16 Feb 2018 07:01  -  17 Feb 2018 07:00  --------------------------------------------------------  IN: 1440 mL / OUT: 750 mL / NET: 690 mL        Appearance: Normal	  Cardiovascular: Normal S1 S2,RRR, No JVD, No murmurs  Respiratory: Lungs clear to auscultation	  Gastrointestinal: + LLQ tenderness, Soft, + BS	  Extremities: Normal range of motion, No clubbing, cyanosis or edema  Vascular: Peripheral pulses palpable 2+ bilaterally     LABS:	 	                          7.7    9.14  )-----------( 144      ( 17 Feb 2018 03:41 )             22.5     02-17    134<L>  |  94<L>  |  136<H>  ----------------------------<  96  3.4<L>   |  20<L>  |  3.46<H>    Ca    9.4      17 Feb 2018 03:41  Phos  4.5     02-17  Mg     1.9     02-17    TPro  6.4  /  Alb  3.5  /  TBili  0.3  /  DBili  x   /  AST  12  /  ALT  7   /  AlkPhos  71  02-17          CARDIAC MARKERS:    MEDICATIONS  (STANDING):  allopurinol 100 milliGRAM(s) Oral two times a day after meals  aspirin  chewable 81 milliGRAM(s) Oral daily  atorvastatin 20 milliGRAM(s) Oral at bedtime  calcitriol   Capsule 0.5 MICROGram(s) Oral daily  carvedilol 12.5 milliGRAM(s) Oral every 12 hours  cholecalciferol 2000 Unit(s) Oral daily  ciprofloxacin   IVPB 200 milliGRAM(s) IV Intermittent every 24 hours  citalopram 40 milliGRAM(s) Oral daily  dicyclomine 20 milliGRAM(s) Oral two times a day before meals  docusate sodium 100 milliGRAM(s) Oral two times a day  folic acid 0.4 milliGRAM(s) Oral daily  gemfibrozil 300 milliGRAM(s) Oral two times a day before meals  heparin  Injectable 5000 Unit(s) SubCutaneous every 12 hours  metroNIDAZOLE  IVPB 500 milliGRAM(s) IV Intermittent every 8 hours  pantoprazole    Tablet 40 milliGRAM(s) Oral two times a day before meals  potassium chloride    Tablet ER 40 milliEquivalent(s) Oral once  sevelamer hydrochloride 800 milliGRAM(s) Oral three times a day with meals  simethicone 80 milliGRAM(s) Chew two times a day  sodium chloride 0.9%. 1000 milliLiter(s) (100 mL/Hr) IV Continuous <Continuous>  tiotropium 18 MICROgram(s) Capsule 1 Capsule(s) Inhalation daily

## 2018-02-17 NOTE — PROGRESS NOTE ADULT - SUBJECTIVE AND OBJECTIVE BOX
ROSE CARLTON:[id number],   67yMale followed for: abdominal pain  No Known Allergies    PAST MEDICAL & SURGICAL HISTORY:  Sleep apnea  Chronic back pain  CKD (chronic kidney disease)  AAA (abdominal aortic aneurysm)  COPD bronchitis  Hyperlipidemia  Hypertension  Hernia  Gastric bypass status for obesity  Abdominal aortic aneurysm    FAMILY HISTORY:  No pertinent family history in first degree relatives    MEDICATIONS  (STANDING):  allopurinol 100 milliGRAM(s) Oral two times a day after meals  aspirin  chewable 81 milliGRAM(s) Oral daily  atorvastatin 20 milliGRAM(s) Oral at bedtime  calcitriol   Capsule 0.5 MICROGram(s) Oral daily  carvedilol 12.5 milliGRAM(s) Oral every 12 hours  cholecalciferol 2000 Unit(s) Oral daily  ciprofloxacin   IVPB 200 milliGRAM(s) IV Intermittent every 24 hours  citalopram 40 milliGRAM(s) Oral daily  dicyclomine 20 milliGRAM(s) Oral two times a day before meals  docusate sodium 100 milliGRAM(s) Oral two times a day  folic acid 0.4 milliGRAM(s) Oral daily  gemfibrozil 300 milliGRAM(s) Oral two times a day before meals  heparin  Injectable 5000 Unit(s) SubCutaneous every 12 hours  metroNIDAZOLE  IVPB 500 milliGRAM(s) IV Intermittent every 8 hours  pantoprazole    Tablet 40 milliGRAM(s) Oral two times a day before meals  sevelamer hydrochloride 800 milliGRAM(s) Oral three times a day with meals  simethicone 80 milliGRAM(s) Chew two times a day  sodium chloride 0.9%. 1000 milliLiter(s) (100 mL/Hr) IV Continuous <Continuous>  tiotropium 18 MICROgram(s) Capsule 1 Capsule(s) Inhalation daily    MEDICATIONS  (PRN):  ALBUTerol    90 MICROgram(s) HFA Inhaler 2 Puff(s) Inhalation every 6 hours PRN Cough, Wheezing  methadone    Tablet 10 milliGRAM(s) Oral five times a day PRN severe pain  oxyCODONE    5 mG/acetaminophen 325 mG 2 Tablet(s) Oral every 6 hours PRN moderate, severe pain      Vital Signs Last 24 Hrs  T(C): 36.9 (17 Feb 2018 04:54), Max: 36.9 (17 Feb 2018 04:54)  T(F): 98.4 (17 Feb 2018 04:54), Max: 98.4 (17 Feb 2018 04:54)  HR: 62 (17 Feb 2018 04:54) (61 - 65)  BP: 122/68 (17 Feb 2018 04:54) (99/54 - 127/61)  BP(mean): --  RR: 23 (17 Feb 2018 04:54) (14 - 23)  SpO2: 97% (17 Feb 2018 04:54) (97% - 98%)  nc/at  s1s2  cta  soft, nt, nd no guarding or rebound  no c/c/e    CBC Full  -  ( 17 Feb 2018 03:41 )  WBC Count : 9.14 K/uL  Hemoglobin : 7.7 g/dL  Hematocrit : 22.5 %  Platelet Count - Automated : 144 K/uL  Mean Cell Volume : 98.3 fL  Mean Cell Hemoglobin : 33.6 pg  Mean Cell Hemoglobin Concentration : 34.2 %  Auto Neutrophil # : x  Auto Lymphocyte # : x  Auto Monocyte # : x  Auto Eosinophil # : x  Auto Basophil # : x  Auto Neutrophil % : x  Auto Lymphocyte % : x  Auto Monocyte % : x  Auto Eosinophil % : x  Auto Basophil % : x    02-17    134<L>  |  94<L>  |  136<H>  ----------------------------<  96  3.4<L>   |  20<L>  |  3.46<H>    Ca    9.4      17 Feb 2018 03:41  Phos  4.5     02-17  Mg     1.9     02-17    TPro  6.4  /  Alb  3.5  /  TBili  0.3  /  DBili  x   /  AST  12  /  ALT  7   /  AlkPhos  71  02-17

## 2018-02-17 NOTE — PROGRESS NOTE ADULT - ASSESSMENT
67M with transverse colon colitis on CT, HIDA neg for acute ventura    - Cont Abx  - Care per primary team  - No acute surgical issues.

## 2018-02-17 NOTE — PROGRESS NOTE ADULT - SUBJECTIVE AND OBJECTIVE BOX
Infectious Diseases progress note:    Subjective:  No acute o/n events.  Pt tolerating PO.  No N/V.  BMs improved, denies loose stools.  States abd pain is still there but better.  WBC normalized.    ROS:  CONSTITUTIONAL:  No fever, chills, rigors  CARDIOVASCULAR:  No chest pain or palpitations  RESPIRATORY:   No SOB, cough, dyspnea on exertion.  No wheezing  GASTROINTESTINAL:  No abd pain, N/V, diarrhea/constipation  EXTREMITIES:  No swelling or joint pain  GENITOURINARY:  No burning on urination, increased frequency or urgency.  No flank pain  NEUROLOGIC:  No HA, visual disturbances  SKIN: No rashes    Allergies    No Known Allergies    Intolerances        ANTIBIOTICS/RELEVANT:  antimicrobials  ciprofloxacin   IVPB 200 milliGRAM(s) IV Intermittent every 24 hours  metroNIDAZOLE  IVPB 500 milliGRAM(s) IV Intermittent every 8 hours    immunologic:    OTHER:  ALBUTerol    90 MICROgram(s) HFA Inhaler 2 Puff(s) Inhalation every 6 hours PRN  allopurinol 100 milliGRAM(s) Oral two times a day after meals  aspirin  chewable 81 milliGRAM(s) Oral daily  atorvastatin 20 milliGRAM(s) Oral at bedtime  calcitriol   Capsule 0.5 MICROGram(s) Oral daily  carvedilol 12.5 milliGRAM(s) Oral every 12 hours  cholecalciferol 2000 Unit(s) Oral daily  citalopram 40 milliGRAM(s) Oral daily  dicyclomine 20 milliGRAM(s) Oral two times a day before meals  docusate sodium 100 milliGRAM(s) Oral two times a day  folic acid 0.4 milliGRAM(s) Oral daily  gemfibrozil 300 milliGRAM(s) Oral two times a day before meals  heparin  Injectable 5000 Unit(s) SubCutaneous every 12 hours  methadone    Tablet 10 milliGRAM(s) Oral five times a day PRN  oxyCODONE    5 mG/acetaminophen 325 mG 2 Tablet(s) Oral every 6 hours PRN  pantoprazole    Tablet 40 milliGRAM(s) Oral two times a day before meals  sevelamer hydrochloride 800 milliGRAM(s) Oral three times a day with meals  simethicone 80 milliGRAM(s) Chew two times a day  sodium chloride 0.9%. 1000 milliLiter(s) IV Continuous <Continuous>  tiotropium 18 MICROgram(s) Capsule 1 Capsule(s) Inhalation daily      Objective:  Vital Signs Last 24 Hrs  T(C): 36.4 (17 Feb 2018 14:20), Max: 36.9 (17 Feb 2018 04:54)  T(F): 97.5 (17 Feb 2018 14:20), Max: 98.4 (17 Feb 2018 04:54)  HR: 65 (17 Feb 2018 17:05) (61 - 71)  BP: 118/48 (17 Feb 2018 17:05) (112/68 - 122/68)  BP(mean): --  RR: 18 (17 Feb 2018 17:05) (14 - 23)  SpO2: 98% (17 Feb 2018 17:05) (97% - 98%)    PHYSICAL EXAM:  Constitutional:NAD  Eyes:PONCHO, EOMI  Ear/Nose/Throat: no thrush, mucositis.  Moist mucous membranes	  Neck:no JVD, no lymphadenopathy, supple  Respiratory: CTA olimpia  Cardiovascular: S1S2 RRR, no murmurs  Gastrointestinal:soft, nontender,  nondistended (+) BS, no rebound/guarding  Extremities:no e/e/c  Skin:  no rashes, open wounds or ulcerations        LABS:                        7.7    9.14  )-----------( 144      ( 17 Feb 2018 03:41 )             22.5     02-17    134<L>  |  94<L>  |  136<H>  ----------------------------<  96  3.4<L>   |  20<L>  |  3.46<H>    Ca    9.4      17 Feb 2018 03:41  Phos  4.5     02-17  Mg     1.9     02-17    TPro  6.4  /  Alb  3.5  /  TBili  0.3  /  DBili  x   /  AST  12  /  ALT  7   /  AlkPhos  71  02-17            MICROBIOLOGY:    Culture - Urine (02.15.18 @ 15:45)    Culture - Urine:   NO GROWTH AT 24 HOURS    Specimen Source: URINE MIDSTREAM      Culture - Blood (02.15.18 @ 12:14)    Culture - Blood:   NO ORGANISMS ISOLATED  NO ORGANISMS ISOLATED AT 48 HRS.    Specimen Source: BLOOD PERIPHERAL    Clostridium difficile Toxin by PCR (02.15.18 @ 00:11)    Clostridium difficile Toxin by PCR: NotDetec: The results of this test should be interpreted with  consideration of all clinical and laboratory findings. C.  difficile PCR is more sensitive and specific than EIA,  therefore, repeat testing during one episode does not  provide additional clinically useful information. One test  per episode is sufficient for determining C. difficile  infection status.    A result of C. difficile tcdB gene not detected does not  preclude the possibility of colonization with C. difficile.  Negative results may occur from improper specimen  collection, handling or storage, or because the number of  organisms in the specimen is below the analytical  sensitivity of the test.    This test is performed on the Cepheid Gene Xpert detection  system which automates and integrates sample purification,  Nucleic acid amplification and detection of the target  sequence in simple or complex samples using real-time PCR  and RT-PCR assays.        RADIOLOGY & ADDITIONAL STUDIES:

## 2018-02-17 NOTE — PROGRESS NOTE ADULT - SUBJECTIVE AND OBJECTIVE BOX
[facility] NEPHROLOGY- PROGRESS NOTE    67 year old male with history of CHF presents with CP. Nephrology consulted for elevated Scr.    REVIEW OF SYSTEMS:  Gen: no changes in weight  Cards: no chest pain  Resp: no dyspnea  GI: no nausea or vomiting or diarrhea, + ab pain  Vascular: no LE edema    No Known Allergies      Hospital Medications: Medications reviewed    VITALS:  T(F): 98.4 (18 @ 04:54), Max: 98.4 (18 @ 04:54)  HR: 62 (18 @ 04:54)  BP: 122/68 (18 @ 04:54)  RR: 23 (18 @ 04:54)  SpO2: 97% (18 @ 04:54)  Wt(kg): --     @ 07:01  -   @ 07:00  --------------------------------------------------------  IN: 1440 mL / OUT: 750 mL / NET: 690 mL     @ 07:01  -   @ 12:34  --------------------------------------------------------  IN: 120 mL / OUT: 0 mL / NET: 120 mL      PHYSICAL EXAM:    Gen: NAD, calm  Cards: RRR, +S1/S2, no M/G/R  Resp: CTA B/L  GI: soft, NT/ND, NABS  Vascular: no LE edema B/L      LABS:      134<L>  |  94<L>  |  136<H>  ----------------------------<  96  3.4<L>   |  20<L>  |  3.46<H>    Ca    9.4      2018 03:41  Phos  4.5       Mg     1.9         TPro  6.4  /  Alb  3.5  /  TBili  0.3  /  DBili      /  AST  12  /  ALT  7   /  AlkPhos  71      Creatinine Trend: 3.46 <--, 3.64 <--, 3.00 <--, 2.47 <--, 2.67 <--                        7.7    9.14  )-----------( 144      ( 2018 03:41 )             22.5     Urine Studies:  Urinalysis Basic - ( 15 Feb 2018 14:51 )    Color: YELLOW / Appearance: CLEAR / S.017 / pH: 5.5  Gluc: NEGATIVE / Ketone: NEGATIVE  / Bili: NEGATIVE / Urobili: NORMAL mg/dL   Blood: NEGATIVE / Protein: NEGATIVE mg/dL / Nitrite: NEGATIVE   Leuk Esterase: NEGATIVE / RBC: 0-2 / WBC 2-5   Sq Epi: OCC / Non Sq Epi:  / Bacteria: FEW      Creatinine, Random Urine: 134.13 mg/dL (02-15 @ 20:38)

## 2018-02-17 NOTE — PROGRESS NOTE ADULT - ASSESSMENT
68 y/o M with h/o HTN, HLD, CKD stage IV, CAD s/p 1 stent, AAA repair, COPD admitted with chest pain    1. Chest pain with atypical features   hx of cad/pci   stress test with mild inferior ischemia with overall preserved EF  echo revealing grossly normal lv sys fx  continue with low dose ASA   will hold off further cardiac testing until colitis resolves    2. CKD   renal f.u    3. Colitis  abx  mgmt per GI   GI f/u     dvt ppx

## 2018-02-17 NOTE — PROGRESS NOTE ADULT - ASSESSMENT
68 y/o M with h/o HTN, HLD, CKD stage IV, CAD s/p 1 stent, AAA repair, COPD presents to the ED for chest pain X 1 day. Pt states he has had left sided constant chest pain with no radiation, nonexertional, nonpleuritic. Pt states the chest pain has currently resolved. Pt also states he suffers from chronic back pain since 1971 for years now. Pt denies LOC, syncope, fever, chills, palpitations shortness of breath, N/V/D/C, tingling, dysuria, urinary/bowel incontinence or any other complaints at this time. (14 Feb 2018 02:20)    Pt c/o gas like lower abd pain for past 2-3 days.  He denies fevers/chills/rigors/N/V/diarrhea.  He has history of gallstones, and opted not to remove his GB during his gastric bypass surgery.  WBC increased today.  Abd US with gallstones and distention.      Problem/Plan - 1:  ·	Leukocytosis    - In the setting of abdominal pain.  Abd US with gallstones and distention.  Pt with h/o of cholelithiasis in the past.  No fever or elevated LFTs.      - HIDA scan negative for cholecystitis    - CTAP shows colitis involving transverse colon.  Cdiff negative.  Send stool cultures (sent on 2/17), O&P (testing), GI pcr (not sent)    - Cont cipro/flagyl    - Monitor WBC    D/W pt at  bedside.    Belinda Madrigal  [phone]

## 2018-02-18 LAB
ALBUMIN SERPL ELPH-MCNC: 3.5 G/DL — SIGNIFICANT CHANGE UP (ref 3.3–5)
ALP SERPL-CCNC: 74 U/L — SIGNIFICANT CHANGE UP (ref 40–120)
ALT FLD-CCNC: 7 U/L — SIGNIFICANT CHANGE UP (ref 4–41)
AST SERPL-CCNC: 13 U/L — SIGNIFICANT CHANGE UP (ref 4–40)
BILIRUB SERPL-MCNC: 0.3 MG/DL — SIGNIFICANT CHANGE UP (ref 0.2–1.2)
BUN SERPL-MCNC: 103 MG/DL — HIGH (ref 7–23)
CALCIUM SERPL-MCNC: 8.9 MG/DL — SIGNIFICANT CHANGE UP (ref 8.4–10.5)
CHLORIDE SERPL-SCNC: 100 MMOL/L — SIGNIFICANT CHANGE UP (ref 98–107)
CO2 SERPL-SCNC: 20 MMOL/L — LOW (ref 22–31)
CREAT SERPL-MCNC: 2.79 MG/DL — HIGH (ref 0.5–1.3)
GLUCOSE SERPL-MCNC: 89 MG/DL — SIGNIFICANT CHANGE UP (ref 70–99)
HCT VFR BLD CALC: 23.6 % — LOW (ref 39–50)
HGB BLD-MCNC: 7.8 G/DL — LOW (ref 13–17)
MAGNESIUM SERPL-MCNC: 2 MG/DL — SIGNIFICANT CHANGE UP (ref 1.6–2.6)
MCHC RBC-ENTMCNC: 32.6 PG — SIGNIFICANT CHANGE UP (ref 27–34)
MCHC RBC-ENTMCNC: 33.1 % — SIGNIFICANT CHANGE UP (ref 32–36)
MCV RBC AUTO: 98.7 FL — SIGNIFICANT CHANGE UP (ref 80–100)
NRBC # FLD: 0 — SIGNIFICANT CHANGE UP
PHOSPHATE SERPL-MCNC: 3.4 MG/DL — SIGNIFICANT CHANGE UP (ref 2.5–4.5)
PLATELET # BLD AUTO: 156 K/UL — SIGNIFICANT CHANGE UP (ref 150–400)
PMV BLD: 10.2 FL — SIGNIFICANT CHANGE UP (ref 7–13)
POTASSIUM SERPL-MCNC: 3.7 MMOL/L — SIGNIFICANT CHANGE UP (ref 3.5–5.3)
POTASSIUM SERPL-SCNC: 3.7 MMOL/L — SIGNIFICANT CHANGE UP (ref 3.5–5.3)
PROT SERPL-MCNC: 6.6 G/DL — SIGNIFICANT CHANGE UP (ref 6–8.3)
RBC # BLD: 2.39 M/UL — LOW (ref 4.2–5.8)
RBC # FLD: 14.6 % — HIGH (ref 10.3–14.5)
SODIUM SERPL-SCNC: 138 MMOL/L — SIGNIFICANT CHANGE UP (ref 135–145)
WBC # BLD: 6.28 K/UL — SIGNIFICANT CHANGE UP (ref 3.8–10.5)
WBC # FLD AUTO: 6.28 K/UL — SIGNIFICANT CHANGE UP (ref 3.8–10.5)

## 2018-02-18 RX ORDER — POTASSIUM CHLORIDE 20 MEQ
40 PACKET (EA) ORAL ONCE
Qty: 0 | Refills: 0 | Status: COMPLETED | OUTPATIENT
Start: 2018-02-18 | End: 2018-02-18

## 2018-02-18 RX ADMIN — Medication 0.4 MILLIGRAM(S): at 09:03

## 2018-02-18 RX ADMIN — Medication 20 MILLIGRAM(S): at 17:41

## 2018-02-18 RX ADMIN — Medication 81 MILLIGRAM(S): at 09:03

## 2018-02-18 RX ADMIN — CALCITRIOL 0.5 MICROGRAM(S): 0.5 CAPSULE ORAL at 09:02

## 2018-02-18 RX ADMIN — METHADONE HYDROCHLORIDE 10 MILLIGRAM(S): 40 TABLET ORAL at 19:07

## 2018-02-18 RX ADMIN — SIMETHICONE 80 MILLIGRAM(S): 80 TABLET, CHEWABLE ORAL at 17:41

## 2018-02-18 RX ADMIN — SIMETHICONE 80 MILLIGRAM(S): 80 TABLET, CHEWABLE ORAL at 05:36

## 2018-02-18 RX ADMIN — CITALOPRAM 40 MILLIGRAM(S): 10 TABLET, FILM COATED ORAL at 09:03

## 2018-02-18 RX ADMIN — METHADONE HYDROCHLORIDE 10 MILLIGRAM(S): 40 TABLET ORAL at 14:33

## 2018-02-18 RX ADMIN — PANTOPRAZOLE SODIUM 40 MILLIGRAM(S): 20 TABLET, DELAYED RELEASE ORAL at 05:36

## 2018-02-18 RX ADMIN — Medication 300 MILLIGRAM(S): at 17:41

## 2018-02-18 RX ADMIN — ATORVASTATIN CALCIUM 20 MILLIGRAM(S): 80 TABLET, FILM COATED ORAL at 22:14

## 2018-02-18 RX ADMIN — SEVELAMER CARBONATE 800 MILLIGRAM(S): 2400 POWDER, FOR SUSPENSION ORAL at 12:51

## 2018-02-18 RX ADMIN — Medication 100 MILLIGRAM(S): at 17:41

## 2018-02-18 RX ADMIN — Medication 100 MILLIGRAM(S): at 05:36

## 2018-02-18 RX ADMIN — CARVEDILOL PHOSPHATE 12.5 MILLIGRAM(S): 80 CAPSULE, EXTENDED RELEASE ORAL at 05:36

## 2018-02-18 RX ADMIN — METHADONE HYDROCHLORIDE 10 MILLIGRAM(S): 40 TABLET ORAL at 23:50

## 2018-02-18 RX ADMIN — Medication 40 MILLIEQUIVALENT(S): at 12:51

## 2018-02-18 RX ADMIN — Medication 100 MILLIGRAM(S): at 09:03

## 2018-02-18 RX ADMIN — SEVELAMER CARBONATE 800 MILLIGRAM(S): 2400 POWDER, FOR SUSPENSION ORAL at 17:41

## 2018-02-18 RX ADMIN — Medication 100 MILLIGRAM(S): at 22:15

## 2018-02-18 RX ADMIN — SEVELAMER CARBONATE 800 MILLIGRAM(S): 2400 POWDER, FOR SUSPENSION ORAL at 09:02

## 2018-02-18 RX ADMIN — Medication 2000 UNIT(S): at 09:02

## 2018-02-18 RX ADMIN — Medication 100 MILLIGRAM(S): at 05:37

## 2018-02-18 RX ADMIN — Medication 300 MILLIGRAM(S): at 05:36

## 2018-02-18 RX ADMIN — Medication 20 MILLIGRAM(S): at 05:36

## 2018-02-18 RX ADMIN — Medication 100 MILLIGRAM(S): at 13:58

## 2018-02-18 RX ADMIN — HEPARIN SODIUM 5000 UNIT(S): 5000 INJECTION INTRAVENOUS; SUBCUTANEOUS at 05:37

## 2018-02-18 RX ADMIN — CARVEDILOL PHOSPHATE 12.5 MILLIGRAM(S): 80 CAPSULE, EXTENDED RELEASE ORAL at 17:42

## 2018-02-18 RX ADMIN — PANTOPRAZOLE SODIUM 40 MILLIGRAM(S): 20 TABLET, DELAYED RELEASE ORAL at 17:41

## 2018-02-18 RX ADMIN — HEPARIN SODIUM 5000 UNIT(S): 5000 INJECTION INTRAVENOUS; SUBCUTANEOUS at 17:40

## 2018-02-18 RX ADMIN — TIOTROPIUM BROMIDE 1 CAPSULE(S): 18 CAPSULE ORAL; RESPIRATORY (INHALATION) at 09:01

## 2018-02-18 RX ADMIN — Medication 100 MILLIGRAM(S): at 22:14

## 2018-02-18 NOTE — PROGRESS NOTE ADULT - SUBJECTIVE AND OBJECTIVE BOX
CARDIOLOGY FOLLOW UP - Dr. Serra    CC no chest pain or sob    c.o abd discomfort/ diarrhea     PHYSICAL EXAM:  T(C): 36.3 (02-18-18 @ 05:33), Max: 36.5 (02-17-18 @ 22:18)  HR: 61 (02-18-18 @ 05:33) (61 - 71)  BP: 121/56 (02-18-18 @ 05:33) (111/64 - 121/56)  RR: 18 (02-18-18 @ 05:33) (15 - 18)  SpO2: 96% (02-18-18 @ 05:33) (96% - 98%)  Wt(kg): --  I&O's Summary    17 Feb 2018 07:01  -  18 Feb 2018 07:00  --------------------------------------------------------  IN: 1620 mL / OUT: 800 mL / NET: 820 mL        Appearance: Normal	  Cardiovascular: Normal S1 S2,RRR, No JVD, No murmurs  Respiratory: Lungs clear to auscultation	  Gastrointestinal:  Soft, mild -tenderness , + BS	  Extremities: Normal range of motion, No clubbing, cyanosis or edema        MEDICATIONS  (STANDING):  allopurinol 100 milliGRAM(s) Oral two times a day after meals  aspirin  chewable 81 milliGRAM(s) Oral daily  atorvastatin 20 milliGRAM(s) Oral at bedtime  calcitriol   Capsule 0.5 MICROGram(s) Oral daily  carvedilol 12.5 milliGRAM(s) Oral every 12 hours  cholecalciferol 2000 Unit(s) Oral daily  ciprofloxacin   IVPB 200 milliGRAM(s) IV Intermittent every 24 hours  citalopram 40 milliGRAM(s) Oral daily  dicyclomine 20 milliGRAM(s) Oral two times a day before meals  docusate sodium 100 milliGRAM(s) Oral two times a day  folic acid 0.4 milliGRAM(s) Oral daily  gemfibrozil 300 milliGRAM(s) Oral two times a day before meals  heparin  Injectable 5000 Unit(s) SubCutaneous every 12 hours  metroNIDAZOLE  IVPB 500 milliGRAM(s) IV Intermittent every 8 hours  pantoprazole    Tablet 40 milliGRAM(s) Oral two times a day before meals  potassium chloride    Tablet ER 40 milliEquivalent(s) Oral once  sevelamer hydrochloride 800 milliGRAM(s) Oral three times a day with meals  simethicone 80 milliGRAM(s) Chew two times a day  sodium chloride 0.9%. 1000 milliLiter(s) (100 mL/Hr) IV Continuous <Continuous>  tiotropium 18 MICROgram(s) Capsule 1 Capsule(s) Inhalation daily      TELEMETRY: NSR 	    ECG:  	  RADIOLOGY:   DIAGNOSTIC TESTING:  [ ] Echocardiogram:  [ ]  Catheterization:  [ ] Stress Test:    OTHER: 	    LABS:	 	                                7.8    6.28  )-----------( 156      ( 18 Feb 2018 06:37 )             23.6     02-18    138  |  100  |  103<H>  ----------------------------<  89  3.7   |  20<L>  |  2.79<H>    Ca    8.9      18 Feb 2018 06:30  Phos  3.4     02-18  Mg     2.0     02-18    TPro  6.6  /  Alb  3.5  /  TBili  0.3  /  DBili  x   /  AST  13  /  ALT  7   /  AlkPhos  74  02-18

## 2018-02-18 NOTE — PROGRESS NOTE ADULT - ASSESSMENT
67M with transverse colon colitis on CT, HIDA neg for acute ventura    - Cont Abx per ID  - Care per primary team/GI  - No acute surgical issues.

## 2018-02-18 NOTE — PROGRESS NOTE ADULT - SUBJECTIVE AND OBJECTIVE BOX
INTERVAL HPI/OVERNIGHT EVENTS: Pt seen and examined. HIDA scan was negative. Complains of moderate lower abd pain and loose bms. Denies fever    MEDICATIONS  (STANDING):  allopurinol 100 milliGRAM(s) Oral two times a day after meals  aspirin  chewable 81 milliGRAM(s) Oral daily  atorvastatin 20 milliGRAM(s) Oral at bedtime  calcitriol   Capsule 0.5 MICROGram(s) Oral daily  carvedilol 12.5 milliGRAM(s) Oral every 12 hours  cholecalciferol 2000 Unit(s) Oral daily  citalopram 40 milliGRAM(s) Oral daily  dicyclomine 20 milliGRAM(s) Oral two times a day before meals  docusate sodium 100 milliGRAM(s) Oral two times a day  folic acid 0.4 milliGRAM(s) Oral daily  gemfibrozil 300 milliGRAM(s) Oral two times a day before meals  heparin  Injectable 5000 Unit(s) SubCutaneous every 12 hours  pantoprazole    Tablet 40 milliGRAM(s) Oral two times a day before meals  piperacillin/tazobactam IVPB. 3.375 Gram(s) IV Intermittent every 12 hours  potassium chloride    Tablet ER 20 milliEquivalent(s) Oral every 2 hours  sevelamer hydrochloride 800 milliGRAM(s) Oral three times a day with meals  simethicone 80 milliGRAM(s) Chew two times a day  tiotropium 18 MICROgram(s) Capsule 1 Capsule(s) Inhalation daily    MEDICATIONS  (PRN):  ALBUTerol    90 MICROgram(s) HFA Inhaler 2 Puff(s) Inhalation every 6 hours PRN Cough, Wheezing  methadone    Tablet 10 milliGRAM(s) Oral five times a day PRN severe pain  oxyCODONE    5 mG/acetaminophen 325 mG 2 Tablet(s) Oral every 6 hours PRN moderate, severe pain      Vital Signs Last 24 Hrs  T(C): 36.7 (2018 12:33), Max: 36.9 (15 Feb 2018 20:51)  T(F): 98 (2018 12:33), Max: 98.5 (15 Feb 2018 20:51)  HR: 62 (2018 12:33) (60 - 63)  BP: 127/61 (2018 12:33) (100/64 - 127/61)  BP(mean): --  RR: 16 (2018 12:33) (12 - 16)  SpO2: 97% (2018 12:33) (95% - 97%)    PHYSICAL EXAM:      Constitutional: NAD    Gastrointestinal: Abd soft, ND, tender to hypo-umbilical palpation        I&O's Detail    15 Feb 2018 07:01  -  2018 07:00  --------------------------------------------------------  IN:    IV PiggyBack: 200 mL    Oral Fluid: 670 mL  Total IN: 870 mL    OUT:  Total OUT: 0 mL    Total NET: 870 mL          LABS:                        8.6    13.86 )-----------( 164      ( 2018 08:06 )             25.5     02-16    132<L>  |  89<L>  |  144<H>  ----------------------------<  106<H>  3.3<L>   |  21<L>  |  3.64<H>    Ca    8.7      2018 07:06  Phos  4.1     02-15  Mg     1.8     -16    TPro  7.4  /  Alb  4.4  /  TBili  0.4  /  DBili  0.1  /  AST  16  /  ALT  6   /  AlkPhos  101  02-15      Urinalysis Basic - ( 15 Feb 2018 14:51 )    Color: YELLOW / Appearance: CLEAR / S.017 / pH: 5.5  Gluc: NEGATIVE / Ketone: NEGATIVE  / Bili: NEGATIVE / Urobili: NORMAL mg/dL   Blood: NEGATIVE / Protein: NEGATIVE mg/dL / Nitrite: NEGATIVE   Leuk Esterase: NEGATIVE / RBC: 0-2 / WBC 2-5   Sq Epi: OCC / Non Sq Epi: x / Bacteria: FEW        RADIOLOGY & ADDITIONAL STUDIES:

## 2018-02-18 NOTE — PROGRESS NOTE ADULT - SUBJECTIVE AND OBJECTIVE BOX
[facility] NEPHROLOGY- PROGRESS NOTE    67 year old male with history of CHF presents with CP. Nephrology consulted for elevated Scr.    REVIEW OF SYSTEMS:  Gen: no changes in weight  Cards: no chest pain  Resp: no dyspnea  GI: no nausea or vomiting or diarrhea, + ab pain  Vascular: no LE edema    No Known Allergies      Hospital Medications: Medications reviewed    VITALS:  T(F): 97.3 (18 @ 05:33), Max: 97.7 (18 @ 22:18)  HR: 61 (18 @ 05:33)  BP: 121/56 (18 @ 05:33)  RR: 18 (18 @ 05:33)  SpO2: 96% (18 @ 05:33)  Wt(kg): --     @ 07:01  -   @ 07:00  --------------------------------------------------------  IN: 1620 mL / OUT: 800 mL / NET: 820 mL      PHYSICAL EXAM:    Gen: NAD, calm  Cards: RRR, +S1/S2, no M/G/R  Resp: CTA B/L  GI: soft, NT/ND, NABS  Vascular: no LE edema B/L    LABS:      138  |  100  |  103<H>  ----------------------------<  89  3.7   |  20<L>  |  2.79<H>    Ca    8.9      2018 06:30  Phos  3.4       Mg     2.0         TPro  6.6  /  Alb  3.5  /  TBili  0.3  /  DBili      /  AST  13  /  ALT  7   /  AlkPhos  74      Creatinine Trend: 2.79 <--, 3.46 <--, 3.64 <--, 3.00 <--, 2.47 <--, 2.67 <--                        7.8    6.28  )-----------( 156      ( 2018 06:37 )             23.6     Urine Studies:  Urinalysis Basic - ( 15 Feb 2018 14:51 )    Color: YELLOW / Appearance: CLEAR / S.017 / pH: 5.5  Gluc: NEGATIVE / Ketone: NEGATIVE  / Bili: NEGATIVE / Urobili: NORMAL mg/dL   Blood: NEGATIVE / Protein: NEGATIVE mg/dL / Nitrite: NEGATIVE   Leuk Esterase: NEGATIVE / RBC: 0-2 / WBC 2-5   Sq Epi: OCC / Non Sq Epi:  / Bacteria: FEW      Creatinine, Random Urine: 134.13 mg/dL (02-15 @ 20:38)

## 2018-02-18 NOTE — PROGRESS NOTE ADULT - ASSESSMENT
continue cipro flagyl colitis, slow clinical improvement.  no colonsocopy planned secondary to comorbidies, / risk:benefit not in favor at this time.

## 2018-02-18 NOTE — PROGRESS NOTE ADULT - SUBJECTIVE AND OBJECTIVE BOX
ROSE CARLTON:[id number],   67yMale followed for: colitis  No Known Allergies    PAST MEDICAL & SURGICAL HISTORY:  Sleep apnea  Chronic back pain  CKD (chronic kidney disease)  AAA (abdominal aortic aneurysm)  COPD bronchitis  Hyperlipidemia  Hypertension  Hernia  Gastric bypass status for obesity  Abdominal aortic aneurysm    FAMILY HISTORY:  No pertinent family history in first degree relatives    MEDICATIONS  (STANDING):  allopurinol 100 milliGRAM(s) Oral two times a day after meals  aspirin  chewable 81 milliGRAM(s) Oral daily  atorvastatin 20 milliGRAM(s) Oral at bedtime  calcitriol   Capsule 0.5 MICROGram(s) Oral daily  carvedilol 12.5 milliGRAM(s) Oral every 12 hours  cholecalciferol 2000 Unit(s) Oral daily  ciprofloxacin   IVPB 200 milliGRAM(s) IV Intermittent every 24 hours  citalopram 40 milliGRAM(s) Oral daily  dicyclomine 20 milliGRAM(s) Oral two times a day before meals  docusate sodium 100 milliGRAM(s) Oral two times a day  folic acid 0.4 milliGRAM(s) Oral daily  gemfibrozil 300 milliGRAM(s) Oral two times a day before meals  heparin  Injectable 5000 Unit(s) SubCutaneous every 12 hours  metroNIDAZOLE  IVPB 500 milliGRAM(s) IV Intermittent every 8 hours  pantoprazole    Tablet 40 milliGRAM(s) Oral two times a day before meals  sevelamer hydrochloride 800 milliGRAM(s) Oral three times a day with meals  simethicone 80 milliGRAM(s) Chew two times a day  sodium chloride 0.9%. 1000 milliLiter(s) (100 mL/Hr) IV Continuous <Continuous>  tiotropium 18 MICROgram(s) Capsule 1 Capsule(s) Inhalation daily    MEDICATIONS  (PRN):  ALBUTerol    90 MICROgram(s) HFA Inhaler 2 Puff(s) Inhalation every 6 hours PRN Cough, Wheezing  methadone    Tablet 10 milliGRAM(s) Oral five times a day PRN severe pain  oxyCODONE    5 mG/acetaminophen 325 mG 2 Tablet(s) Oral every 6 hours PRN moderate, severe pain      Vital Signs Last 24 Hrs  T(C): 36.3 (18 Feb 2018 05:33), Max: 36.5 (17 Feb 2018 22:18)  T(F): 97.3 (18 Feb 2018 05:33), Max: 97.7 (17 Feb 2018 22:18)  HR: 61 (18 Feb 2018 05:33) (61 - 71)  BP: 121/56 (18 Feb 2018 05:33) (111/64 - 121/56)  BP(mean): --  RR: 18 (18 Feb 2018 05:33) (15 - 18)  SpO2: 96% (18 Feb 2018 05:33) (96% - 98%)  nc/at  s1s2  cta  soft, nt, nd no guarding or rebound  no c/c/e    CBC Full  -  ( 18 Feb 2018 06:37 )  WBC Count : 6.28 K/uL  Hemoglobin : 7.8 g/dL  Hematocrit : 23.6 %  Platelet Count - Automated : 156 K/uL  Mean Cell Volume : 98.7 fL  Mean Cell Hemoglobin : 32.6 pg  Mean Cell Hemoglobin Concentration : 33.1 %  Auto Neutrophil # : x  Auto Lymphocyte # : x  Auto Monocyte # : x  Auto Eosinophil # : x  Auto Basophil # : x  Auto Neutrophil % : x  Auto Lymphocyte % : x  Auto Monocyte % : x  Auto Eosinophil % : x  Auto Basophil % : x    02-18    138  |  100  |  103<H>  ----------------------------<  89  3.7   |  20<L>  |  2.79<H>    Ca    8.9      18 Feb 2018 06:30  Phos  3.4     02-18  Mg     2.0     02-18    TPro  6.6  /  Alb  3.5  /  TBili  0.3  /  DBili  x   /  AST  13  /  ALT  7   /  AlkPhos  74  02-18

## 2018-02-19 LAB
ALBUMIN SERPL ELPH-MCNC: 3.8 G/DL — SIGNIFICANT CHANGE UP (ref 3.3–5)
ALP SERPL-CCNC: 72 U/L — SIGNIFICANT CHANGE UP (ref 40–120)
ALT FLD-CCNC: 8 U/L — SIGNIFICANT CHANGE UP (ref 4–41)
AST SERPL-CCNC: 16 U/L — SIGNIFICANT CHANGE UP (ref 4–40)
BILIRUB SERPL-MCNC: 0.3 MG/DL — SIGNIFICANT CHANGE UP (ref 0.2–1.2)
BUN SERPL-MCNC: 86 MG/DL — HIGH (ref 7–23)
CALCIUM SERPL-MCNC: 9.4 MG/DL — SIGNIFICANT CHANGE UP (ref 8.4–10.5)
CHLORIDE SERPL-SCNC: 101 MMOL/L — SIGNIFICANT CHANGE UP (ref 98–107)
CO2 SERPL-SCNC: 19 MMOL/L — LOW (ref 22–31)
CREAT SERPL-MCNC: 2.61 MG/DL — HIGH (ref 0.5–1.3)
GLUCOSE SERPL-MCNC: 109 MG/DL — HIGH (ref 70–99)
HCT VFR BLD CALC: 25.5 % — LOW (ref 39–50)
HGB BLD-MCNC: 8.3 G/DL — LOW (ref 13–17)
MAGNESIUM SERPL-MCNC: 1.8 MG/DL — SIGNIFICANT CHANGE UP (ref 1.6–2.6)
MCHC RBC-ENTMCNC: 32.4 PG — SIGNIFICANT CHANGE UP (ref 27–34)
MCHC RBC-ENTMCNC: 32.5 % — SIGNIFICANT CHANGE UP (ref 32–36)
MCV RBC AUTO: 99.6 FL — SIGNIFICANT CHANGE UP (ref 80–100)
NRBC # FLD: 0 — SIGNIFICANT CHANGE UP
PHOSPHATE SERPL-MCNC: 3.1 MG/DL — SIGNIFICANT CHANGE UP (ref 2.5–4.5)
PLATELET # BLD AUTO: 171 K/UL — SIGNIFICANT CHANGE UP (ref 150–400)
PMV BLD: 10.5 FL — SIGNIFICANT CHANGE UP (ref 7–13)
POTASSIUM SERPL-MCNC: 4 MMOL/L — SIGNIFICANT CHANGE UP (ref 3.5–5.3)
POTASSIUM SERPL-SCNC: 4 MMOL/L — SIGNIFICANT CHANGE UP (ref 3.5–5.3)
PROT SERPL-MCNC: 7 G/DL — SIGNIFICANT CHANGE UP (ref 6–8.3)
RBC # BLD: 2.56 M/UL — LOW (ref 4.2–5.8)
RBC # FLD: 14.6 % — HIGH (ref 10.3–14.5)
SODIUM SERPL-SCNC: 138 MMOL/L — SIGNIFICANT CHANGE UP (ref 135–145)
SPECIMEN SOURCE: SIGNIFICANT CHANGE UP
WBC # BLD: 6.24 K/UL — SIGNIFICANT CHANGE UP (ref 3.8–10.5)
WBC # FLD AUTO: 6.24 K/UL — SIGNIFICANT CHANGE UP (ref 3.8–10.5)

## 2018-02-19 RX ORDER — METRONIDAZOLE 500 MG
500 TABLET ORAL EVERY 8 HOURS
Qty: 0 | Refills: 0 | Status: DISCONTINUED | OUTPATIENT
Start: 2018-02-19 | End: 2018-02-20

## 2018-02-19 RX ORDER — CIPROFLOXACIN LACTATE 400MG/40ML
250 VIAL (ML) INTRAVENOUS
Qty: 0 | Refills: 0 | Status: DISCONTINUED | OUTPATIENT
Start: 2018-02-19 | End: 2018-02-20

## 2018-02-19 RX ADMIN — SEVELAMER CARBONATE 800 MILLIGRAM(S): 2400 POWDER, FOR SUSPENSION ORAL at 13:05

## 2018-02-19 RX ADMIN — Medication 20 MILLIGRAM(S): at 05:45

## 2018-02-19 RX ADMIN — Medication 100 MILLIGRAM(S): at 13:05

## 2018-02-19 RX ADMIN — Medication 100 MILLIGRAM(S): at 05:46

## 2018-02-19 RX ADMIN — SEVELAMER CARBONATE 800 MILLIGRAM(S): 2400 POWDER, FOR SUSPENSION ORAL at 17:23

## 2018-02-19 RX ADMIN — Medication 100 MILLIGRAM(S): at 05:45

## 2018-02-19 RX ADMIN — Medication 300 MILLIGRAM(S): at 05:44

## 2018-02-19 RX ADMIN — Medication 300 MILLIGRAM(S): at 17:23

## 2018-02-19 RX ADMIN — ATORVASTATIN CALCIUM 20 MILLIGRAM(S): 80 TABLET, FILM COATED ORAL at 22:04

## 2018-02-19 RX ADMIN — HEPARIN SODIUM 5000 UNIT(S): 5000 INJECTION INTRAVENOUS; SUBCUTANEOUS at 17:24

## 2018-02-19 RX ADMIN — CALCITRIOL 0.5 MICROGRAM(S): 0.5 CAPSULE ORAL at 09:09

## 2018-02-19 RX ADMIN — METHADONE HYDROCHLORIDE 10 MILLIGRAM(S): 40 TABLET ORAL at 18:57

## 2018-02-19 RX ADMIN — PANTOPRAZOLE SODIUM 40 MILLIGRAM(S): 20 TABLET, DELAYED RELEASE ORAL at 17:24

## 2018-02-19 RX ADMIN — Medication 0.4 MILLIGRAM(S): at 09:08

## 2018-02-19 RX ADMIN — Medication 2000 UNIT(S): at 09:09

## 2018-02-19 RX ADMIN — Medication 250 MILLIGRAM(S): at 22:04

## 2018-02-19 RX ADMIN — SIMETHICONE 80 MILLIGRAM(S): 80 TABLET, CHEWABLE ORAL at 17:24

## 2018-02-19 RX ADMIN — Medication 100 MILLIGRAM(S): at 17:25

## 2018-02-19 RX ADMIN — TIOTROPIUM BROMIDE 1 CAPSULE(S): 18 CAPSULE ORAL; RESPIRATORY (INHALATION) at 09:08

## 2018-02-19 RX ADMIN — CARVEDILOL PHOSPHATE 12.5 MILLIGRAM(S): 80 CAPSULE, EXTENDED RELEASE ORAL at 05:45

## 2018-02-19 RX ADMIN — Medication 20 MILLIGRAM(S): at 17:23

## 2018-02-19 RX ADMIN — SEVELAMER CARBONATE 800 MILLIGRAM(S): 2400 POWDER, FOR SUSPENSION ORAL at 09:08

## 2018-02-19 RX ADMIN — Medication 81 MILLIGRAM(S): at 09:09

## 2018-02-19 RX ADMIN — Medication 500 MILLIGRAM(S): at 22:04

## 2018-02-19 RX ADMIN — CARVEDILOL PHOSPHATE 12.5 MILLIGRAM(S): 80 CAPSULE, EXTENDED RELEASE ORAL at 17:23

## 2018-02-19 RX ADMIN — Medication 100 MILLIGRAM(S): at 09:09

## 2018-02-19 RX ADMIN — HEPARIN SODIUM 5000 UNIT(S): 5000 INJECTION INTRAVENOUS; SUBCUTANEOUS at 05:45

## 2018-02-19 RX ADMIN — SIMETHICONE 80 MILLIGRAM(S): 80 TABLET, CHEWABLE ORAL at 05:45

## 2018-02-19 RX ADMIN — PANTOPRAZOLE SODIUM 40 MILLIGRAM(S): 20 TABLET, DELAYED RELEASE ORAL at 05:45

## 2018-02-19 RX ADMIN — CITALOPRAM 40 MILLIGRAM(S): 10 TABLET, FILM COATED ORAL at 09:09

## 2018-02-19 NOTE — PROGRESS NOTE ADULT - SUBJECTIVE AND OBJECTIVE BOX
INTERVAL HPI/OVERNIGHT EVENTS: Pt seen and examined. Complains of multiple (6) episodes of diarrhea since 6am. Anytime he eats, he has urgent bowel movements.  to hypoumbilical palpation. Denies abdominal cramping.    MEDICATIONS  (STANDING):  allopurinol 100 milliGRAM(s) Oral two times a day after meals  aspirin  chewable 81 milliGRAM(s) Oral daily  atorvastatin 20 milliGRAM(s) Oral at bedtime  calcitriol   Capsule 0.5 MICROGram(s) Oral daily  carvedilol 12.5 milliGRAM(s) Oral every 12 hours  cholecalciferol 2000 Unit(s) Oral daily  ciprofloxacin   IVPB 200 milliGRAM(s) IV Intermittent every 24 hours  citalopram 40 milliGRAM(s) Oral daily  dicyclomine 20 milliGRAM(s) Oral two times a day before meals  docusate sodium 100 milliGRAM(s) Oral two times a day  folic acid 0.4 milliGRAM(s) Oral daily  gemfibrozil 300 milliGRAM(s) Oral two times a day before meals  heparin  Injectable 5000 Unit(s) SubCutaneous every 12 hours  metroNIDAZOLE  IVPB 500 milliGRAM(s) IV Intermittent every 8 hours  pantoprazole    Tablet 40 milliGRAM(s) Oral two times a day before meals  sevelamer hydrochloride 800 milliGRAM(s) Oral three times a day with meals  simethicone 80 milliGRAM(s) Chew two times a day  tiotropium 18 MICROgram(s) Capsule 1 Capsule(s) Inhalation daily    MEDICATIONS  (PRN):  ALBUTerol    90 MICROgram(s) HFA Inhaler 2 Puff(s) Inhalation every 6 hours PRN Cough, Wheezing  methadone    Tablet 10 milliGRAM(s) Oral five times a day PRN severe pain  oxyCODONE    5 mG/acetaminophen 325 mG 2 Tablet(s) Oral every 6 hours PRN moderate, severe pain      Vital Signs Last 24 Hrs  T(C): 36.6 (19 Feb 2018 05:38), Max: 36.6 (18 Feb 2018 22:03)  T(F): 97.9 (19 Feb 2018 05:38), Max: 97.9 (18 Feb 2018 22:03)  HR: 65 (19 Feb 2018 05:38) (65 - 78)  BP: 149/56 (19 Feb 2018 05:38) (103/52 - 149/56)  BP(mean): --  RR: 18 (19 Feb 2018 05:38) (18 - 18)  SpO2: 98% (19 Feb 2018 05:38) (98% - 98%)    PHYSICAL EXAM:      Constitutional: NAD    Gastrointestinal: Abd soft, mildly tender hypoumbilical, ND        I&O's Detail    18 Feb 2018 07:01  -  19 Feb 2018 07:00  --------------------------------------------------------  IN:    IV PiggyBack: 300 mL    Oral Fluid: 1730 mL  Total IN: 2030 mL    OUT:  Total OUT: 0 mL    Total NET: 2030 mL          LABS:                        8.3    6.24  )-----------( 171      ( 19 Feb 2018 06:17 )             25.5     02-19    138  |  101  |  86<H>  ----------------------------<  109<H>  4.0   |  19<L>  |  2.61<H>    Ca    9.4      19 Feb 2018 06:17  Phos  3.1     02-19  Mg     1.8     02-19    TPro  7.0  /  Alb  3.8  /  TBili  0.3  /  DBili  x   /  AST  16  /  ALT  8   /  AlkPhos  72  02-19          RADIOLOGY & ADDITIONAL STUDIES:

## 2018-02-19 NOTE — PROGRESS NOTE ADULT - SUBJECTIVE AND OBJECTIVE BOX
[facility] NEPHROLOGY- PROGRESS NOTE    67 year old male with history of CHF presents with CP. Nephrology consulted for elevated Scr.    REVIEW OF SYSTEMS:  Gen: no changes in weight  Cards: no chest pain  Resp: no dyspnea  GI: no nausea or vomiting. + several episodes of diarrhea, + ab pain  Vascular: no LE edema    No Known Allergies      Hospital Medications: Medications reviewed    VITALS:  T(F): 97.9 (18 @ 05:38), Max: 97.9 (18 @ 22:03)  HR: 65 (18 @ 05:38)  BP: 149/56 (18 @ 05:38)  RR: 18 (18 @ 05:38)  SpO2: 98% (18 @ 05:38)  Wt(kg): --     @ 07:01  -   @ 07:00  --------------------------------------------------------  IN: 2030 mL / OUT: 0 mL / NET: 2030 mL     @ 07:01  -   @ 12:20  --------------------------------------------------------  IN: 360 mL / OUT: 0 mL / NET: 360 mL      PHYSICAL EXAM:    Gen: NAD, calm  Cards: RRR, +S1/S2, no M/G/R  Resp: CTA B/L  GI: soft, NT/ND, NABS  Vascular: no LE edema B/L    LABS:      138  |  101  |  86<H>  ----------------------------<  109<H>  4.0   |  19<L>  |  2.61<H>    Ca    9.4      2018 06:17  Phos  3.1       Mg     1.8         TPro  7.0  /  Alb  3.8  /  TBili  0.3  /  DBili      /  AST  16  /  ALT  8   /  AlkPhos  72      Creatinine Trend: 2.61 <--, 2.79 <--, 3.46 <--, 3.64 <--, 3.00 <--, 2.47 <--, 2.67 <--                        8.3    6.24  )-----------( 171      ( 2018 06:17 )             25.5     Urine Studies:  Urinalysis Basic - ( 15 Feb 2018 14:51 )    Color: YELLOW / Appearance: CLEAR / S.017 / pH: 5.5  Gluc: NEGATIVE / Ketone: NEGATIVE  / Bili: NEGATIVE / Urobili: NORMAL mg/dL   Blood: NEGATIVE / Protein: NEGATIVE mg/dL / Nitrite: NEGATIVE   Leuk Esterase: NEGATIVE / RBC: 0-2 / WBC 2-5   Sq Epi: OCC / Non Sq Epi:  / Bacteria: FEW      Creatinine, Random Urine: 134.13 mg/dL (02-15 @ 20:38)

## 2018-02-19 NOTE — PROGRESS NOTE ADULT - ASSESSMENT
67M with colitis    - Start IVF  - Change diet to Low fiber diet to limit bowel irritation  - Consider checking for C diff  - Cont Abx  - Care per primary team  - No acute surgical issues, we will sign off. Please reconsult at anytime

## 2018-02-19 NOTE — PROGRESS NOTE ADULT - SUBJECTIVE AND OBJECTIVE BOX
CARDIOLOGY FOLLOW UP NOTE - DR. GRIMES    Subjective:    no chest pain  no sob  no abd pain  stuill with diarrhea    PHYSICAL EXAM:  T(C): 36.3 (02-19-18 @ 12:38), Max: 36.6 (02-18-18 @ 22:03)  HR: 70 (02-19-18 @ 12:38) (65 - 78)  BP: 122/64 (02-19-18 @ 12:38) (103/52 - 149/56)  RR: 18 (02-19-18 @ 12:38) (18 - 18)  SpO2: 98% (02-19-18 @ 12:38) (98% - 98%)  Wt(kg): --  I&O's Summary    18 Feb 2018 07:01  -  19 Feb 2018 07:00  --------------------------------------------------------  IN: 2030 mL / OUT: 0 mL / NET: 2030 mL    19 Feb 2018 07:01  -  19 Feb 2018 13:19  --------------------------------------------------------  IN: 360 mL / OUT: 0 mL / NET: 360 mL        Appearance: Normal	  Cardiovascular: Normal S1 S2,RRR, No JVD, No murmurs  Respiratory: Lungs clear to auscultation	  Gastrointestinal:  Soft, Non-tender, + BS	  Extremities: Normal range of motion, No clubbing, cyanosis or edema      MEDICATIONS  (STANDING):  allopurinol 100 milliGRAM(s) Oral two times a day after meals  aspirin  chewable 81 milliGRAM(s) Oral daily  atorvastatin 20 milliGRAM(s) Oral at bedtime  calcitriol   Capsule 0.5 MICROGram(s) Oral daily  carvedilol 12.5 milliGRAM(s) Oral every 12 hours  cholecalciferol 2000 Unit(s) Oral daily  ciprofloxacin   IVPB 200 milliGRAM(s) IV Intermittent every 24 hours  citalopram 40 milliGRAM(s) Oral daily  dicyclomine 20 milliGRAM(s) Oral two times a day before meals  docusate sodium 100 milliGRAM(s) Oral two times a day  folic acid 0.4 milliGRAM(s) Oral daily  gemfibrozil 300 milliGRAM(s) Oral two times a day before meals  heparin  Injectable 5000 Unit(s) SubCutaneous every 12 hours  metroNIDAZOLE  IVPB 500 milliGRAM(s) IV Intermittent every 8 hours  pantoprazole    Tablet 40 milliGRAM(s) Oral two times a day before meals  sevelamer hydrochloride 800 milliGRAM(s) Oral three times a day with meals  simethicone 80 milliGRAM(s) Chew two times a day  tiotropium 18 MICROgram(s) Capsule 1 Capsule(s) Inhalation daily      TELEMETRY: 	    ECG:  	  RADIOLOGY:   DIAGNOSTIC TESTING:  [ ] Echocardiogram:  [ ] Catheterization:  [ ] Stress Test:    OTHER: 	    LABS:	 	    CARDIAC MARKERS:                                8.3    6.24  )-----------( 171      ( 19 Feb 2018 06:17 )             25.5     02-19    138  |  101  |  86<H>  ----------------------------<  109<H>  4.0   |  19<L>  |  2.61<H>    Ca    9.4      19 Feb 2018 06:17  Phos  3.1     02-19  Mg     1.8     02-19    TPro  7.0  /  Alb  3.8  /  TBili  0.3  /  DBili  x   /  AST  16  /  ALT  8   /  AlkPhos  72  02-19    proBNP:     Lipid Profile:   HgA1c:

## 2018-02-19 NOTE — PROGRESS NOTE ADULT - ASSESSMENT
66 y/o M with h/o HTN, HLD, CKD stage IV, CAD s/p 1 stent, AAA repair, COPD admitted with chest pain    1. Chest pain with atypical features   hx of cad/pci   stress test with mild inferior ischemia with overall preserved EF  echo revealing grossly normal lv sys fx  continue with low dose ASA   will hold off further cardiac testing until colitis resolves    2. CKD   renal f.u  creat improved    3. Colitis  abx  mgmt per GI   abd pain resolved  still with diarrhea with meals  abx  change to po on d/c  d/c plan per gi    dvt ppx     hopeful d/c david

## 2018-02-19 NOTE — PROGRESS NOTE ADULT - ASSESSMENT
68 y/o M with h/o HTN, HLD, CKD stage IV, CAD s/p 1 stent, AAA repair, COPD presents to the ED for chest pain X 1 day. Pt states he has had left sided constant chest pain with no radiation, nonexertional, nonpleuritic. Pt states the chest pain has currently resolved. Pt also states he suffers from chronic back pain since 1971 for years now. Pt denies LOC, syncope, fever, chills, palpitations shortness of breath, N/V/D/C, tingling, dysuria, urinary/bowel incontinence or any other complaints at this time. (14 Feb 2018 02:20)    Pt c/o gas like lower abd pain for past 2-3 days.  He denies fevers/chills/rigors/N/V/diarrhea.  He has history of gallstones, and opted not to remove his GB during his gastric bypass surgery.  WBC increased today.  Abd US with gallstones and distention.      Problem/Plan - 1:  ·	Leukocytosis    - In the setting of abdominal pain.  Abd US with gallstones and distention.  Pt with h/o of cholelithiasis in the past.  No fever or elevated LFTs.      - HIDA scan negative for cholecystitis    - CTAP shows colitis involving transverse colon.  Cdiff negative.  Send stool cultures (sent on 2/17), O&P (testing), GI pcr (testing)    - Cont cipro/flagyl, Change to PO and complete 10 day course (through 2/25)      D/W pt at  bedside.    Belinda Madrigal  [phone]

## 2018-02-19 NOTE — PROGRESS NOTE ADULT - SUBJECTIVE AND OBJECTIVE BOX
Infectious Diseases progress note:    Subjective: Overall BMs better.  Had about 4 stools (states they are formed), with gas.  Pain improving.  No fever/chills    ROS:  CONSTITUTIONAL:  No fever, chills, rigors  CARDIOVASCULAR:  No chest pain or palpitations  RESPIRATORY:   No SOB, cough, dyspnea on exertion.  No wheezing  GASTROINTESTINAL:  No abd pain, N/V, diarrhea/constipation  EXTREMITIES:  No swelling or joint pain  GENITOURINARY:  No burning on urination, increased frequency or urgency.  No flank pain  NEUROLOGIC:  No HA, visual disturbances  SKIN: No rashes    Allergies    No Known Allergies    Intolerances        ANTIBIOTICS/RELEVANT:  antimicrobials  ciprofloxacin   IVPB 200 milliGRAM(s) IV Intermittent every 24 hours  metroNIDAZOLE  IVPB 500 milliGRAM(s) IV Intermittent every 8 hours    immunologic:    OTHER:  ALBUTerol    90 MICROgram(s) HFA Inhaler 2 Puff(s) Inhalation every 6 hours PRN  allopurinol 100 milliGRAM(s) Oral two times a day after meals  aspirin  chewable 81 milliGRAM(s) Oral daily  atorvastatin 20 milliGRAM(s) Oral at bedtime  calcitriol   Capsule 0.5 MICROGram(s) Oral daily  carvedilol 12.5 milliGRAM(s) Oral every 12 hours  cholecalciferol 2000 Unit(s) Oral daily  citalopram 40 milliGRAM(s) Oral daily  dicyclomine 20 milliGRAM(s) Oral two times a day before meals  docusate sodium 100 milliGRAM(s) Oral two times a day  folic acid 0.4 milliGRAM(s) Oral daily  gemfibrozil 300 milliGRAM(s) Oral two times a day before meals  heparin  Injectable 5000 Unit(s) SubCutaneous every 12 hours  methadone    Tablet 10 milliGRAM(s) Oral five times a day PRN  oxyCODONE    5 mG/acetaminophen 325 mG 2 Tablet(s) Oral every 6 hours PRN  pantoprazole    Tablet 40 milliGRAM(s) Oral two times a day before meals  sevelamer hydrochloride 800 milliGRAM(s) Oral three times a day with meals  simethicone 80 milliGRAM(s) Chew two times a day  tiotropium 18 MICROgram(s) Capsule 1 Capsule(s) Inhalation daily      Objective:  Vital Signs Last 24 Hrs  T(C): 36.3 (19 Feb 2018 12:38), Max: 36.6 (18 Feb 2018 22:03)  T(F): 97.4 (19 Feb 2018 12:38), Max: 97.9 (18 Feb 2018 22:03)  HR: 76 (19 Feb 2018 17:04) (65 - 78)  BP: 120/67 (19 Feb 2018 17:04) (103/52 - 149/56)  BP(mean): --  RR: 18 (19 Feb 2018 12:38) (18 - 18)  SpO2: 98% (19 Feb 2018 12:38) (98% - 98%)    PHYSICAL EXAM:  Constitutional:NAD  Eyes:PONCHO, EOMI  Ear/Nose/Throat: no thrush, mucositis.  Moist mucous membranes	  Neck:no JVD, no lymphadenopathy, supple  Respiratory: CTA olimpia  Cardiovascular: S1S2 RRR, no murmurs  Gastrointestinal:soft, nontender,  nondistended (+) BS  Extremities:no e/e/c  Skin:  no rashes, open wounds or ulcerations        LABS:                        8.3    6.24  )-----------( 171      ( 19 Feb 2018 06:17 )             25.5     02-19    138  |  101  |  86<H>  ----------------------------<  109<H>  4.0   |  19<L>  |  2.61<H>    Ca    9.4      19 Feb 2018 06:17  Phos  3.1     02-19  Mg     1.8     02-19    TPro  7.0  /  Alb  3.8  /  TBili  0.3  /  DBili  x   /  AST  16  /  ALT  8   /  AlkPhos  72  02-19          MICROBIOLOGY:    Culture - Stool (02.17.18 @ 17:31)    Culture - Stool:   ****************** PRELIMINARY **************************  NEGATIVE FOR SALMONELLA, SHIGELLA, YERSINIA,  E. COLI O157, AEROMONAS, PLESIOMONAS, AND VIBRIO SP.                      AFTER 24 HOURS  *********************************************************    Specimen Source: FECES    Clostridium difficile Toxin by PCR (02.15.18 @ 00:11)    Clostridium difficile Toxin by PCR: NotDetec: The results of this test should be interpreted with  consideration of all clinical and laboratory findings. C.  difficile PCR is more sensitive and specific than EIA,  therefore, repeat testing during one episode does not  provide additional clinically useful information. One test  per episode is sufficient for determining C. difficile  infection status.    A result of C. difficile tcdB gene not detected does not  preclude the possibility of colonization with C. difficile.  Negative results may occur from improper specimen  collection, handling or storage, or because the number of  organisms in the specimen is below the analytical  sensitivity of the test.    This test is performed on the Cepheid Gene Xpert detection  system which automates and integrates sample purification,  Nucleic acid amplification and detection of the target  sequence in simple or complex samples using real-time PCR  and RT-PCR assays.          RADIOLOGY & ADDITIONAL STUDIES:

## 2018-02-19 NOTE — PROGRESS NOTE ADULT - SUBJECTIVE AND OBJECTIVE BOX
ROSE CARLTON:[id number],   67yMale followed for: colitis  No Known Allergies    PAST MEDICAL & SURGICAL HISTORY:  Sleep apnea  Chronic back pain  CKD (chronic kidney disease)  AAA (abdominal aortic aneurysm)  COPD bronchitis  Hyperlipidemia  Hypertension  Hernia  Gastric bypass status for obesity  Abdominal aortic aneurysm    FAMILY HISTORY:  No pertinent family history in first degree relatives    MEDICATIONS  (STANDING):  allopurinol 100 milliGRAM(s) Oral two times a day after meals  aspirin  chewable 81 milliGRAM(s) Oral daily  atorvastatin 20 milliGRAM(s) Oral at bedtime  calcitriol   Capsule 0.5 MICROGram(s) Oral daily  carvedilol 12.5 milliGRAM(s) Oral every 12 hours  cholecalciferol 2000 Unit(s) Oral daily  ciprofloxacin   IVPB 200 milliGRAM(s) IV Intermittent every 24 hours  citalopram 40 milliGRAM(s) Oral daily  dicyclomine 20 milliGRAM(s) Oral two times a day before meals  docusate sodium 100 milliGRAM(s) Oral two times a day  folic acid 0.4 milliGRAM(s) Oral daily  gemfibrozil 300 milliGRAM(s) Oral two times a day before meals  heparin  Injectable 5000 Unit(s) SubCutaneous every 12 hours  metroNIDAZOLE  IVPB 500 milliGRAM(s) IV Intermittent every 8 hours  pantoprazole    Tablet 40 milliGRAM(s) Oral two times a day before meals  sevelamer hydrochloride 800 milliGRAM(s) Oral three times a day with meals  simethicone 80 milliGRAM(s) Chew two times a day  tiotropium 18 MICROgram(s) Capsule 1 Capsule(s) Inhalation daily    MEDICATIONS  (PRN):  ALBUTerol    90 MICROgram(s) HFA Inhaler 2 Puff(s) Inhalation every 6 hours PRN Cough, Wheezing  methadone    Tablet 10 milliGRAM(s) Oral five times a day PRN severe pain  oxyCODONE    5 mG/acetaminophen 325 mG 2 Tablet(s) Oral every 6 hours PRN moderate, severe pain      Vital Signs Last 24 Hrs  T(C): 36.6 (19 Feb 2018 05:38), Max: 36.6 (18 Feb 2018 22:03)  T(F): 97.9 (19 Feb 2018 05:38), Max: 97.9 (18 Feb 2018 22:03)  HR: 65 (19 Feb 2018 05:38) (65 - 78)  BP: 149/56 (19 Feb 2018 05:38) (103/52 - 149/56)  BP(mean): --  RR: 18 (19 Feb 2018 05:38) (18 - 18)  SpO2: 98% (19 Feb 2018 05:38) (98% - 98%)  nc/at  s1s2  cta  soft, nt, nd no guarding or rebound  no c/c/e    CBC Full  -  ( 19 Feb 2018 06:17 )  WBC Count : 6.24 K/uL  Hemoglobin : 8.3 g/dL  Hematocrit : 25.5 %  Platelet Count - Automated : 171 K/uL  Mean Cell Volume : 99.6 fL  Mean Cell Hemoglobin : 32.4 pg  Mean Cell Hemoglobin Concentration : 32.5 %  Auto Neutrophil # : x  Auto Lymphocyte # : x  Auto Monocyte # : x  Auto Eosinophil # : x  Auto Basophil # : x  Auto Neutrophil % : x  Auto Lymphocyte % : x  Auto Monocyte % : x  Auto Eosinophil % : x  Auto Basophil % : x    02-19    138  |  101  |  86<H>  ----------------------------<  109<H>  4.0   |  19<L>  |  2.61<H>    Ca    9.4      19 Feb 2018 06:17  Phos  3.1     02-19  Mg     1.8     02-19    TPro  7.0  /  Alb  3.8  /  TBili  0.3  /  DBili  x   /  AST  16  /  ALT  8   /  AlkPhos  72  02-19

## 2018-02-20 ENCOUNTER — TRANSCRIPTION ENCOUNTER (OUTPATIENT)
Age: 68
End: 2018-02-20

## 2018-02-20 VITALS — WEIGHT: 241.41 LBS

## 2018-02-20 LAB
BACTERIA BLD CULT: SIGNIFICANT CHANGE UP
BACTERIA STL CULT: SIGNIFICANT CHANGE UP
BUN SERPL-MCNC: 74 MG/DL — HIGH (ref 7–23)
CALCIUM SERPL-MCNC: 9 MG/DL — SIGNIFICANT CHANGE UP (ref 8.4–10.5)
CHLORIDE SERPL-SCNC: 101 MMOL/L — SIGNIFICANT CHANGE UP (ref 98–107)
CO2 SERPL-SCNC: 20 MMOL/L — LOW (ref 22–31)
CREAT SERPL-MCNC: 2.5 MG/DL — HIGH (ref 0.5–1.3)
GLUCOSE SERPL-MCNC: 95 MG/DL — SIGNIFICANT CHANGE UP (ref 70–99)
HCT VFR BLD CALC: 22.9 % — LOW (ref 39–50)
HGB BLD-MCNC: 7.7 G/DL — LOW (ref 13–17)
MAGNESIUM SERPL-MCNC: 1.8 MG/DL — SIGNIFICANT CHANGE UP (ref 1.6–2.6)
MCHC RBC-ENTMCNC: 33.5 PG — SIGNIFICANT CHANGE UP (ref 27–34)
MCHC RBC-ENTMCNC: 33.6 % — SIGNIFICANT CHANGE UP (ref 32–36)
MCV RBC AUTO: 99.6 FL — SIGNIFICANT CHANGE UP (ref 80–100)
NRBC # FLD: 0 — SIGNIFICANT CHANGE UP
O+P SPEC CONC: SIGNIFICANT CHANGE UP
PLATELET # BLD AUTO: 161 K/UL — SIGNIFICANT CHANGE UP (ref 150–400)
PMV BLD: 10.2 FL — SIGNIFICANT CHANGE UP (ref 7–13)
POTASSIUM SERPL-MCNC: 4.1 MMOL/L — SIGNIFICANT CHANGE UP (ref 3.5–5.3)
POTASSIUM SERPL-SCNC: 4.1 MMOL/L — SIGNIFICANT CHANGE UP (ref 3.5–5.3)
RBC # BLD: 2.3 M/UL — LOW (ref 4.2–5.8)
RBC # FLD: 14.8 % — HIGH (ref 10.3–14.5)
SODIUM SERPL-SCNC: 137 MMOL/L — SIGNIFICANT CHANGE UP (ref 135–145)
SPECIMEN SOURCE: SIGNIFICANT CHANGE UP
TRI STN SPEC: SIGNIFICANT CHANGE UP
WBC # BLD: 6.38 K/UL — SIGNIFICANT CHANGE UP (ref 3.8–10.5)
WBC # FLD AUTO: 6.38 K/UL — SIGNIFICANT CHANGE UP (ref 3.8–10.5)

## 2018-02-20 RX ORDER — MAGNESIUM OXIDE 400 MG ORAL TABLET 241.3 MG
1 TABLET ORAL
Qty: 0 | Refills: 0 | COMMUNITY

## 2018-02-20 RX ORDER — FUROSEMIDE 40 MG
1 TABLET ORAL
Qty: 0 | Refills: 0 | COMMUNITY
Start: 2018-02-20

## 2018-02-20 RX ORDER — SEVELAMER CARBONATE 2400 MG/1
1 POWDER, FOR SUSPENSION ORAL
Qty: 90 | Refills: 0 | OUTPATIENT
Start: 2018-02-20 | End: 2018-03-21

## 2018-02-20 RX ORDER — METRONIDAZOLE 500 MG
1 TABLET ORAL
Qty: 15 | Refills: 0 | OUTPATIENT
Start: 2018-02-20 | End: 2018-02-24

## 2018-02-20 RX ORDER — CIPROFLOXACIN LACTATE 400MG/40ML
1 VIAL (ML) INTRAVENOUS
Qty: 10 | Refills: 0 | OUTPATIENT
Start: 2018-02-20 | End: 2018-02-24

## 2018-02-20 RX ORDER — GEMFIBROZIL 600 MG
1 TABLET ORAL
Qty: 0 | Refills: 0 | COMMUNITY

## 2018-02-20 RX ORDER — LOSARTAN POTASSIUM 100 MG/1
1 TABLET, FILM COATED ORAL
Qty: 0 | Refills: 0 | COMMUNITY

## 2018-02-20 RX ORDER — POTASSIUM CHLORIDE 20 MEQ
1 PACKET (EA) ORAL
Qty: 0 | Refills: 0 | COMMUNITY

## 2018-02-20 RX ORDER — FUROSEMIDE 40 MG
1 TABLET ORAL
Qty: 0 | Refills: 0 | COMMUNITY

## 2018-02-20 RX ORDER — CALCIUM ACETATE 667 MG
1 TABLET ORAL
Qty: 0 | Refills: 0 | COMMUNITY

## 2018-02-20 RX ORDER — GEMFIBROZIL 600 MG
0.5 TABLET ORAL
Qty: 0 | Refills: 0 | COMMUNITY
Start: 2018-02-20

## 2018-02-20 RX ORDER — SIMETHICONE 80 MG/1
1 TABLET, CHEWABLE ORAL
Qty: 0 | Refills: 0 | COMMUNITY
Start: 2018-02-20

## 2018-02-20 RX ADMIN — Medication 20 MILLIGRAM(S): at 05:27

## 2018-02-20 RX ADMIN — Medication 250 MILLIGRAM(S): at 05:27

## 2018-02-20 RX ADMIN — Medication 300 MILLIGRAM(S): at 05:27

## 2018-02-20 RX ADMIN — SEVELAMER CARBONATE 800 MILLIGRAM(S): 2400 POWDER, FOR SUSPENSION ORAL at 13:16

## 2018-02-20 RX ADMIN — SIMETHICONE 80 MILLIGRAM(S): 80 TABLET, CHEWABLE ORAL at 05:27

## 2018-02-20 RX ADMIN — Medication 500 MILLIGRAM(S): at 05:27

## 2018-02-20 RX ADMIN — PANTOPRAZOLE SODIUM 40 MILLIGRAM(S): 20 TABLET, DELAYED RELEASE ORAL at 05:27

## 2018-02-20 RX ADMIN — Medication 0.4 MILLIGRAM(S): at 11:14

## 2018-02-20 RX ADMIN — Medication 500 MILLIGRAM(S): at 13:16

## 2018-02-20 RX ADMIN — Medication 100 MILLIGRAM(S): at 09:28

## 2018-02-20 RX ADMIN — CALCITRIOL 0.5 MICROGRAM(S): 0.5 CAPSULE ORAL at 11:14

## 2018-02-20 RX ADMIN — CITALOPRAM 40 MILLIGRAM(S): 10 TABLET, FILM COATED ORAL at 11:14

## 2018-02-20 RX ADMIN — HEPARIN SODIUM 5000 UNIT(S): 5000 INJECTION INTRAVENOUS; SUBCUTANEOUS at 05:28

## 2018-02-20 RX ADMIN — CARVEDILOL PHOSPHATE 12.5 MILLIGRAM(S): 80 CAPSULE, EXTENDED RELEASE ORAL at 05:27

## 2018-02-20 RX ADMIN — Medication 2000 UNIT(S): at 11:14

## 2018-02-20 RX ADMIN — TIOTROPIUM BROMIDE 1 CAPSULE(S): 18 CAPSULE ORAL; RESPIRATORY (INHALATION) at 09:28

## 2018-02-20 RX ADMIN — SEVELAMER CARBONATE 800 MILLIGRAM(S): 2400 POWDER, FOR SUSPENSION ORAL at 09:28

## 2018-02-20 RX ADMIN — Medication 81 MILLIGRAM(S): at 11:14

## 2018-02-20 NOTE — PROGRESS NOTE ADULT - PROBLEM SELECTOR PROBLEM 5
Secondary hyperparathyroidism of renal origin

## 2018-02-20 NOTE — PROGRESS NOTE ADULT - ASSESSMENT
67M with colitis    - Start IVF  - Change diet to Low fiber diet to limit bowel irritation  - Consider checking for C diff  - Cont Abx  - Care per primary team, dc planning  - No acute surgical issues, we will sign off.   - Please reconsult at anytime

## 2018-02-20 NOTE — DISCHARGE NOTE ADULT - CARE PROVIDER_API CALL
Jordon Serra (MD), Cardiovascular Disease; Internal Medicine; Interventional Cardiology; Nuclear Cardiology  [address] [facility] [address]  [place] [address]  Phone: [phone]  Fax: [phone]    Benton Neal (DO), Gastroenterology; Internal Medicine  [address] [facility] [address]  [place] [address]  Phone: [phone]  Fax: [phone]    Belinda Madrigal (MD), Infectious Disease; Internal Medicine  [address] [place] [address]  Phone: [phone]  Fax: [phone]    Gianni Wood (DO), Internal Medicine  [address] [place] [address]  Phone: [phone]  Fax: [phone]

## 2018-02-20 NOTE — PROGRESS NOTE ADULT - SUBJECTIVE AND OBJECTIVE BOX
INTERVAL HPI/OVERNIGHT EVENTS: Pt seen and examined. Complains of less frequent bm. Denies abdominal cramping.    MEDICATIONS  (STANDING):  allopurinol 100 milliGRAM(s) Oral two times a day after meals  aspirin  chewable 81 milliGRAM(s) Oral daily  atorvastatin 20 milliGRAM(s) Oral at bedtime  calcitriol   Capsule 0.5 MICROGram(s) Oral daily  carvedilol 12.5 milliGRAM(s) Oral every 12 hours  cholecalciferol 2000 Unit(s) Oral daily  ciprofloxacin   IVPB 200 milliGRAM(s) IV Intermittent every 24 hours  citalopram 40 milliGRAM(s) Oral daily  dicyclomine 20 milliGRAM(s) Oral two times a day before meals  docusate sodium 100 milliGRAM(s) Oral two times a day  folic acid 0.4 milliGRAM(s) Oral daily  gemfibrozil 300 milliGRAM(s) Oral two times a day before meals  heparin  Injectable 5000 Unit(s) SubCutaneous every 12 hours  metroNIDAZOLE  IVPB 500 milliGRAM(s) IV Intermittent every 8 hours  pantoprazole    Tablet 40 milliGRAM(s) Oral two times a day before meals  sevelamer hydrochloride 800 milliGRAM(s) Oral three times a day with meals  simethicone 80 milliGRAM(s) Chew two times a day  tiotropium 18 MICROgram(s) Capsule 1 Capsule(s) Inhalation daily    MEDICATIONS  (PRN):  ALBUTerol    90 MICROgram(s) HFA Inhaler 2 Puff(s) Inhalation every 6 hours PRN Cough, Wheezing  methadone    Tablet 10 milliGRAM(s) Oral five times a day PRN severe pain  oxyCODONE    5 mG/acetaminophen 325 mG 2 Tablet(s) Oral every 6 hours PRN moderate, severe pain      Vital Signs Last 24 Hrs  T(C): 36.6 (19 Feb 2018 05:38), Max: 36.6 (18 Feb 2018 22:03)  T(F): 97.9 (19 Feb 2018 05:38), Max: 97.9 (18 Feb 2018 22:03)  HR: 65 (19 Feb 2018 05:38) (65 - 78)  BP: 149/56 (19 Feb 2018 05:38) (103/52 - 149/56)  BP(mean): --  RR: 18 (19 Feb 2018 05:38) (18 - 18)  SpO2: 98% (19 Feb 2018 05:38) (98% - 98%)    PHYSICAL EXAM:      Constitutional: NAD    Gastrointestinal: Wade brown, NT, ND        I&O's Detail    18 Feb 2018 07:01  -  19 Feb 2018 07:00  --------------------------------------------------------  IN:    IV PiggyBack: 300 mL    Oral Fluid: 1730 mL  Total IN: 2030 mL    OUT:  Total OUT: 0 mL    Total NET: 2030 mL          LABS:                        8.3    6.24  )-----------( 171      ( 19 Feb 2018 06:17 )             25.5     02-19    138  |  101  |  86<H>  ----------------------------<  109<H>  4.0   |  19<L>  |  2.61<H>    Ca    9.4      19 Feb 2018 06:17  Phos  3.1     02-19  Mg     1.8     02-19    TPro  7.0  /  Alb  3.8  /  TBili  0.3  /  DBili  x   /  AST  16  /  ALT  8   /  AlkPhos  72  02-19          RADIOLOGY & ADDITIONAL STUDIES:

## 2018-02-20 NOTE — PROGRESS NOTE ADULT - PROBLEM SELECTOR PROBLEM 4
Edema, lower extremity

## 2018-02-20 NOTE — DISCHARGE NOTE ADULT - MEDICATION SUMMARY - MEDICATIONS TO CHANGE
I will SWITCH the dose or number of times a day I take the medications listed below when I get home from the hospital:    furosemide 20 mg oral tablet  -- 3 tab in AM and 2 tab in PM    gemfibrozil 600 mg oral tablet  -- 1 tab(s) by mouth 2 times a day

## 2018-02-20 NOTE — PROGRESS NOTE ADULT - PROBLEM SELECTOR PROBLEM 1
Acute kidney injury

## 2018-02-20 NOTE — PROGRESS NOTE ADULT - PROBLEM SELECTOR PLAN 1
Patient with MATT suspect hemodynamically mediated due to diuretic therapy as patient appears dry with bland UA and low FeUrea. Renal function improving s/p gentle IVF. Encourage PO fluid intake. Continue holding lasix, metolazone and losartan for now. Avoid nephrotoxins.
Patient with MATT suspect hemodynamically mediated due to diuretic therapy as patient appears dry with bland UA and low FeUrea. Renal function improving s/p gentle IVF. Encourage PO fluid intake. Continue holding lasix, metolazone and losartan for now. Avoid nephrotoxins.
Patient with MATT suspect hemodynamically mediated due to diuretic therapy as patient appears dry with bland UA and low FeUrea. Renal function improving s/p gentle IVF. c/w gentle IV hydration today. Continue holding lasix, metolazone and losartan for now. Avoid nephrotoxins.
Patient with MATT suspect hemodynamically mediated due to diuretic therapy as patient appears dry with bland UA and low FeUrea. Gentle IVF today. Continue holding lasix, metolazone and losartan for now. Avoid nephrotoxins.
Patient with MATT suspect hemodynamically mediated due to diuretic therapy as patient appears dry with bland UA and low FeUrea. Renal function improving s/p gentle IVF. Avoid nephrotoxins.
Patient with MATT suspect hemodynamically mediated due to diuretic therapy as patient appears dry. Check urine urea and urine creatinine. Will hold lasix, metolazone and losartan for now. Avoid nephrotoxins.

## 2018-02-20 NOTE — PROGRESS NOTE ADULT - SUBJECTIVE AND OBJECTIVE BOX
[facility] NEPHROLOGY- PROGRESS NOTE    67 year old male with history of CHF presents with CP. Nephrology consulted for elevated Scr.    REVIEW OF SYSTEMS:  Gen: no changes in weight  Cards: no chest pain  Resp: no dyspnea  GI: no nausea or vomiting. no diarrhea  Vascular: no LE edema    No Known Allergies      Hospital Medications: Medications reviewed    VITALS:  T(F): 97.9 (02-20-18 @ 13:20), Max: 98.4 (02-19-18 @ 22:00)  HR: 73 (02-20-18 @ 13:20)  BP: 111/62 (02-20-18 @ 13:20)  RR: 16 (02-20-18 @ 13:20)  SpO2: 97% (02-20-18 @ 13:20)  Wt(kg): --    02-19 @ 07:01  -  02-20 @ 07:00  --------------------------------------------------------  IN: 2310 mL / OUT: 500 mL / NET: 1810 mL      PHYSICAL EXAM:    Gen: NAD, calm  Cards: RRR, +S1/S2, no M/G/R  Resp: CTA B/L  GI: soft, NT/ND, NABS  Vascular: trace LE edema B/L      LABS:  02-20    137  |  101  |  74<H>  ----------------------------<  95  4.1   |  20<L>  |  2.50<H>    Ca    9.0      20 Feb 2018 06:00  Phos  3.1     02-19  Mg     1.8     02-20    TPro  7.0  /  Alb  3.8  /  TBili  0.3  /  DBili      /  AST  16  /  ALT  8   /  AlkPhos  72  02-19    Creatinine Trend: 2.50 <--, 2.61 <--, 2.79 <--, 3.46 <--, 3.64 <--, 3.00 <--, 2.47 <--, 2.67 <--                        7.7    6.38  )-----------( 161      ( 20 Feb 2018 06:00 )             22.9

## 2018-02-20 NOTE — PROGRESS NOTE ADULT - PROBLEM SELECTOR PROBLEM 3
Hypertensive kidney disease with chronic kidney disease, stage 1 through stage 4 or unspecified 

## 2018-02-20 NOTE — DISCHARGE NOTE ADULT - PLAN OF CARE
Follow up with cardiology for further workup You had an abnormal stress test, please follow up with Dr. Serra for further cardiac testing, an appointment was made for you on 3/1 at 3:30pm. Continue all medications as prescribed. Continue antihypertensive medications as prescribed. Follow up with your primary care physician for further monitoring in 1-2 weeks. Please call to arrange appointment. Continue lipid lowering drugs as prescribed. Low cholesterol diet. Follow up with your primary care physician for further monitoring in 1-2 weeks. Please call to arrange appointment. Take lasix 40mg once a day, hold metolazone. Please follow up with Dr. Wood, contact information provided below, please call to make an appointment. Complete course of antibiotics. Please follow up with GI.

## 2018-02-20 NOTE — PROGRESS NOTE ADULT - PROBLEM SELECTOR PLAN 2
Patient with known CKD-4 (baseline 2.3 as per patient) thought to be due to cardiorenal syndrome and HTN. Defer inpatient CKD work up as patient follow with outpatient nephrologist and with bland UA. Monitor electrolytes.
Patient with known CKD-4 (baseline 2.3 as per patient) thought to be due to cardiorenal syndrome and HTN. Defer inpatient CKD work up as patient follow with outpatient nephrologist and with bland UA. Decrease lopid to 300 mg twice daily. Monitor electrolytes.

## 2018-02-20 NOTE — PROGRESS NOTE ADULT - PROVIDER SPECIALTY LIST ADULT
Cardiology
Colorectal Surgery
Gastroenterology
Infectious Disease
Nephrology
Surgery
Surgery
Infectious Disease
Surgery
Nephrology
Surgery
Nephrology
Nephrology

## 2018-02-20 NOTE — PROGRESS NOTE ADULT - SUBJECTIVE AND OBJECTIVE BOX
ROSE CARLTON:[id number],   67yMale followed for: colitis  No Known Allergies    PAST MEDICAL & SURGICAL HISTORY:  Sleep apnea  Chronic back pain  CKD (chronic kidney disease)  AAA (abdominal aortic aneurysm)  COPD bronchitis  Hyperlipidemia  Hypertension  Hernia  Gastric bypass status for obesity  Abdominal aortic aneurysm    FAMILY HISTORY:  No pertinent family history in first degree relatives    MEDICATIONS  (STANDING):  allopurinol 100 milliGRAM(s) Oral two times a day after meals  aspirin  chewable 81 milliGRAM(s) Oral daily  atorvastatin 20 milliGRAM(s) Oral at bedtime  calcitriol   Capsule 0.5 MICROGram(s) Oral daily  carvedilol 12.5 milliGRAM(s) Oral every 12 hours  cholecalciferol 2000 Unit(s) Oral daily  ciprofloxacin     Tablet 250 milliGRAM(s) Oral two times a day  citalopram 40 milliGRAM(s) Oral daily  dicyclomine 20 milliGRAM(s) Oral two times a day before meals  docusate sodium 100 milliGRAM(s) Oral two times a day  folic acid 0.4 milliGRAM(s) Oral daily  gemfibrozil 300 milliGRAM(s) Oral two times a day before meals  heparin  Injectable 5000 Unit(s) SubCutaneous every 12 hours  metroNIDAZOLE    Tablet 500 milliGRAM(s) Oral every 8 hours  pantoprazole    Tablet 40 milliGRAM(s) Oral two times a day before meals  sevelamer hydrochloride 800 milliGRAM(s) Oral three times a day with meals  simethicone 80 milliGRAM(s) Chew two times a day  tiotropium 18 MICROgram(s) Capsule 1 Capsule(s) Inhalation daily    MEDICATIONS  (PRN):  ALBUTerol    90 MICROgram(s) HFA Inhaler 2 Puff(s) Inhalation every 6 hours PRN Cough, Wheezing  methadone    Tablet 10 milliGRAM(s) Oral five times a day PRN severe pain  oxyCODONE    5 mG/acetaminophen 325 mG 2 Tablet(s) Oral every 6 hours PRN moderate, severe pain      Vital Signs Last 24 Hrs  T(C): 36.7 (20 Feb 2018 05:18), Max: 36.9 (19 Feb 2018 22:00)  T(F): 98 (20 Feb 2018 05:18), Max: 98.4 (19 Feb 2018 22:00)  HR: 62 (20 Feb 2018 05:18) (62 - 78)  BP: 120/60 (20 Feb 2018 05:18) (116/63 - 122/64)  BP(mean): --  RR: 18 (20 Feb 2018 05:18) (18 - 18)  SpO2: 98% (20 Feb 2018 05:18) (98% - 98%)  nc/at  s1s2  cta  soft, nt, nd no guarding or rebound  no c/c/e    CBC Full  -  ( 20 Feb 2018 06:00 )  WBC Count : 6.38 K/uL  Hemoglobin : 7.7 g/dL  Hematocrit : 22.9 %  Platelet Count - Automated : 161 K/uL  Mean Cell Volume : 99.6 fL  Mean Cell Hemoglobin : 33.5 pg  Mean Cell Hemoglobin Concentration : 33.6 %  Auto Neutrophil # : x  Auto Lymphocyte # : x  Auto Monocyte # : x  Auto Eosinophil # : x  Auto Basophil # : x  Auto Neutrophil % : x  Auto Lymphocyte % : x  Auto Monocyte % : x  Auto Eosinophil % : x  Auto Basophil % : x    02-20    137  |  101  |  74<H>  ----------------------------<  95  4.1   |  20<L>  |  2.50<H>    Ca    9.0      20 Feb 2018 06:00  Phos  3.1     02-19  Mg     1.8     02-20    TPro  7.0  /  Alb  3.8  /  TBili  0.3  /  DBili  x   /  AST  16  /  ALT  8   /  AlkPhos  72  02-19

## 2018-02-20 NOTE — PROGRESS NOTE ADULT - ASSESSMENT
complaints of "sweating in his groin" for one daria.  unclear why.   rx colitis 10 days, still mild pain.  can switch to oral.   discussed with id last night.

## 2018-02-20 NOTE — PROGRESS NOTE ADULT - SUBJECTIVE AND OBJECTIVE BOX
Infectious Diseases progress note:    Subjective:  Feeling much better.  Abd pain improving.  Decreased frequency of BM.  No fever/chills/n/v.  Tolerating PO.  Wife at bedside.    ROS:  CONSTITUTIONAL:  No fever, chills, rigors  CARDIOVASCULAR:  No chest pain or palpitations  RESPIRATORY:   No SOB, cough, dyspnea on exertion.  No wheezing  GASTROINTESTINAL:  No abd pain, N/V, diarrhea/constipation  EXTREMITIES:  No swelling or joint pain  GENITOURINARY:  No burning on urination, increased frequency or urgency.  No flank pain  NEUROLOGIC:  No HA, visual disturbances  SKIN: No rashes    Allergies    No Known Allergies    Intolerances        ANTIBIOTICS/RELEVANT:  antimicrobials  ciprofloxacin     Tablet 250 milliGRAM(s) Oral two times a day  metroNIDAZOLE    Tablet 500 milliGRAM(s) Oral every 8 hours    immunologic:    OTHER:  ALBUTerol    90 MICROgram(s) HFA Inhaler 2 Puff(s) Inhalation every 6 hours PRN  allopurinol 100 milliGRAM(s) Oral two times a day after meals  aspirin  chewable 81 milliGRAM(s) Oral daily  atorvastatin 20 milliGRAM(s) Oral at bedtime  calcitriol   Capsule 0.5 MICROGram(s) Oral daily  carvedilol 12.5 milliGRAM(s) Oral every 12 hours  cholecalciferol 2000 Unit(s) Oral daily  citalopram 40 milliGRAM(s) Oral daily  dicyclomine 20 milliGRAM(s) Oral two times a day before meals  docusate sodium 100 milliGRAM(s) Oral two times a day  folic acid 0.4 milliGRAM(s) Oral daily  gemfibrozil 300 milliGRAM(s) Oral two times a day before meals  heparin  Injectable 5000 Unit(s) SubCutaneous every 12 hours  methadone    Tablet 10 milliGRAM(s) Oral five times a day PRN  oxyCODONE    5 mG/acetaminophen 325 mG 2 Tablet(s) Oral every 6 hours PRN  pantoprazole    Tablet 40 milliGRAM(s) Oral two times a day before meals  sevelamer hydrochloride 800 milliGRAM(s) Oral three times a day with meals  simethicone 80 milliGRAM(s) Chew two times a day  tiotropium 18 MICROgram(s) Capsule 1 Capsule(s) Inhalation daily      Objective:  Vital Signs Last 24 Hrs  T(C): 36.6 (20 Feb 2018 13:20), Max: 36.9 (19 Feb 2018 22:00)  T(F): 97.9 (20 Feb 2018 13:20), Max: 98.4 (19 Feb 2018 22:00)  HR: 73 (20 Feb 2018 13:20) (62 - 78)  BP: 111/62 (20 Feb 2018 13:20) (111/62 - 120/67)  BP(mean): --  RR: 16 (20 Feb 2018 13:20) (16 - 18)  SpO2: 97% (20 Feb 2018 13:20) (97% - 98%)    PHYSICAL EXAM:  Constitutional:NAD  Eyes:PONCHO, EOMI  Ear/Nose/Throat: no thrush, mucositis.  Moist mucous membranes	  Neck:no JVD, no lymphadenopathy, supple  Respiratory: CTA olimpia  Cardiovascular: S1S2 RRR, no murmurs  Gastrointestinal:soft, nontender,  nondistended (+) BS  Extremities:no e/e/c  Skin:  no rashes, open wounds or ulcerations        LABS:                        7.7    6.38  )-----------( 161      ( 20 Feb 2018 06:00 )             22.9     02-20    137  |  101  |  74<H>  ----------------------------<  95  4.1   |  20<L>  |  2.50<H>    Ca    9.0      20 Feb 2018 06:00  Phos  3.1     02-19  Mg     1.8     02-20    TPro  7.0  /  Alb  3.8  /  TBili  0.3  /  DBili  x   /  AST  16  /  ALT  8   /  AlkPhos  72  02-19            MICROBIOLOGY:    Culture - Stool (02.17.18 @ 17:31)    Culture - Stool:   ****************** PRELIMINARY **************************  NEGATIVE FOR SALMONELLA, SHIGELLA, YERSINIA,  E. COLI O157, AEROMONAS, PLESIOMONAS, AND VIBRIO SP.                      AFTER 24 HOURS  *********************************************************  ****************** PRELIMINARY *************************  NEGATIVE FOR SALMONELLA, SHIGELLA, YERSINIA, E. COLI O157,  AEROMONAS, PLESIOMONAS, CAMPYLOBACTER AND VIBRIO SP. AFTER  48 HOURS  *********************************************************  **********************FINAL REPORT************************  CULTURE NEGATIVE FOR SALMONELLA, SHIGELLA, YERSINIA, E COLI  O157, AEROMONAS, PLESIOMONAS, CAMPYLOBACTER AND VIBRIO SP.  **********************************************************    Specimen Source: FECES        RADIOLOGY & ADDITIONAL STUDIES:

## 2018-02-20 NOTE — PROGRESS NOTE ADULT - SUBJECTIVE AND OBJECTIVE BOX
CARDIOLOGY FOLLOW UP - Dr. Serra    CC no chest pain or sob    no abd pain   c.o diarrhea     PHYSICAL EXAM:  T(C): 36.7 (02-20-18 @ 05:18), Max: 36.9 (02-19-18 @ 22:00)  HR: 62 (02-20-18 @ 05:18) (62 - 78)  BP: 120/60 (02-20-18 @ 05:18) (116/63 - 122/64)  RR: 18 (02-20-18 @ 05:18) (18 - 18)  SpO2: 98% (02-20-18 @ 05:18) (98% - 98%)  Wt(kg): --  I&O's Summary    19 Feb 2018 07:01  -  20 Feb 2018 07:00  --------------------------------------------------------  IN: 2310 mL / OUT: 500 mL / NET: 1810 mL        Appearance: Normal	  Cardiovascular: Normal S1 S2,RRR, No JVD, No murmurs  Respiratory: Lungs clear to auscultation	  Gastrointestinal:  Soft, Non-tender, + BS	  Extremities: Normal range of motion, No clubbing, cyanosis or edema        MEDICATIONS  (STANDING):  allopurinol 100 milliGRAM(s) Oral two times a day after meals  aspirin  chewable 81 milliGRAM(s) Oral daily  atorvastatin 20 milliGRAM(s) Oral at bedtime  calcitriol   Capsule 0.5 MICROGram(s) Oral daily  carvedilol 12.5 milliGRAM(s) Oral every 12 hours  cholecalciferol 2000 Unit(s) Oral daily  ciprofloxacin     Tablet 250 milliGRAM(s) Oral two times a day  citalopram 40 milliGRAM(s) Oral daily  dicyclomine 20 milliGRAM(s) Oral two times a day before meals  docusate sodium 100 milliGRAM(s) Oral two times a day  folic acid 0.4 milliGRAM(s) Oral daily  gemfibrozil 300 milliGRAM(s) Oral two times a day before meals  heparin  Injectable 5000 Unit(s) SubCutaneous every 12 hours  metroNIDAZOLE    Tablet 500 milliGRAM(s) Oral every 8 hours  pantoprazole    Tablet 40 milliGRAM(s) Oral two times a day before meals  sevelamer hydrochloride 800 milliGRAM(s) Oral three times a day with meals  simethicone 80 milliGRAM(s) Chew two times a day  tiotropium 18 MICROgram(s) Capsule 1 Capsule(s) Inhalation daily      TELEMETRY: 	    ECG:  	  RADIOLOGY:   DIAGNOSTIC TESTING:  [ ] Echocardiogram:  [ ]  Catheterization:  [ ] Stress Test:    OTHER: 	    LABS:	 	                                7.7    6.38  )-----------( 161      ( 20 Feb 2018 06:00 )             22.9     02-20    137  |  101  |  74<H>  ----------------------------<  95  4.1   |  20<L>  |  2.50<H>    Ca    9.0      20 Feb 2018 06:00  Phos  3.1     02-19  Mg     1.8     02-20    TPro  7.0  /  Alb  3.8  /  TBili  0.3  /  DBili  x   /  AST  16  /  ALT  8   /  AlkPhos  72  02-19 CARDIOLOGY FOLLOW UP - Dr. Serra    CC no chest pain or sob    no abd pain   c.o diarrhea     PHYSICAL EXAM:  T(C): 36.7 (02-20-18 @ 05:18), Max: 36.9 (02-19-18 @ 22:00)  HR: 62 (02-20-18 @ 05:18) (62 - 78)  BP: 120/60 (02-20-18 @ 05:18) (116/63 - 122/64)  RR: 18 (02-20-18 @ 05:18) (18 - 18)  SpO2: 98% (02-20-18 @ 05:18) (98% - 98%)  Wt(kg): --  I&O's Summary    19 Feb 2018 07:01  -  20 Feb 2018 07:00  --------------------------------------------------------  IN: 2310 mL / OUT: 500 mL / NET: 1810 mL        Appearance: Normal	  Cardiovascular: Normal S1 S2,RRR, No JVD, No murmurs  Respiratory: Lungs clear to auscultation	  Gastrointestinal:  Soft, Non-tender, + BS	  Extremities: Normal range of motion, No clubbing, cyanosis or edema        MEDICATIONS  (STANDING):  allopurinol 100 milliGRAM(s) Oral two times a day after meals  aspirin  chewable 81 milliGRAM(s) Oral daily  atorvastatin 20 milliGRAM(s) Oral at bedtime  calcitriol   Capsule 0.5 MICROGram(s) Oral daily  carvedilol 12.5 milliGRAM(s) Oral every 12 hours  cholecalciferol 2000 Unit(s) Oral daily  ciprofloxacin     Tablet 250 milliGRAM(s) Oral two times a day  citalopram 40 milliGRAM(s) Oral daily  dicyclomine 20 milliGRAM(s) Oral two times a day before meals  docusate sodium 100 milliGRAM(s) Oral two times a day  folic acid 0.4 milliGRAM(s) Oral daily  gemfibrozil 300 milliGRAM(s) Oral two times a day before meals  heparin  Injectable 5000 Unit(s) SubCutaneous every 12 hours  metroNIDAZOLE    Tablet 500 milliGRAM(s) Oral every 8 hours  pantoprazole    Tablet 40 milliGRAM(s) Oral two times a day before meals  sevelamer hydrochloride 800 milliGRAM(s) Oral three times a day with meals  simethicone 80 milliGRAM(s) Chew two times a day  tiotropium 18 MICROgram(s) Capsule 1 Capsule(s) Inhalation daily      TELEMETRY: NSR/ 1st degree AVB / PVC 	    ECG:  	  RADIOLOGY:   DIAGNOSTIC TESTING:  [ ] Echocardiogram:  [ ]  Catheterization:  [ ] Stress Test:    OTHER: 	    LABS:	 	                                7.7    6.38  )-----------( 161      ( 20 Feb 2018 06:00 )             22.9     02-20    137  |  101  |  74<H>  ----------------------------<  95  4.1   |  20<L>  |  2.50<H>    Ca    9.0      20 Feb 2018 06:00  Phos  3.1     02-19  Mg     1.8     02-20    TPro  7.0  /  Alb  3.8  /  TBili  0.3  /  DBili  x   /  AST  16  /  ALT  8   /  AlkPhos  72  02-19

## 2018-02-20 NOTE — PROGRESS NOTE ADULT - ASSESSMENT
66 y/o M with h/o HTN, HLD, CKD stage IV, CAD s/p 1 stent, AAA repair, COPD presents to the ED for chest pain X 1 day. Pt states he has had left sided constant chest pain with no radiation, nonexertional, nonpleuritic. Pt states the chest pain has currently resolved. Pt also states he suffers from chronic back pain since 1971 for years now. Pt denies LOC, syncope, fever, chills, palpitations shortness of breath, N/V/D/C, tingling, dysuria, urinary/bowel incontinence or any other complaints at this time. (14 Feb 2018 02:20)    Pt c/o gas like lower abd pain for past 2-3 days.  He denies fevers/chills/rigors/N/V/diarrhea.  He has history of gallstones, and opted not to remove his GB during his gastric bypass surgery.  WBC increased today.  Abd US with gallstones and distention.      Problem/Plan - 1:  ·	Leukocytosis    - In the setting of abdominal pain.  Abd US with gallstones and distention.  Pt with h/o of cholelithiasis in the past.  No fever or elevated LFTs.      - HIDA scan negative for cholecystitis    - CTAP shows colitis involving transverse colon.  Cdiff negative.  Send stool cultures (sent on 2/17), O&P (still testing), GI pcr (still testing)    - Cont cipro/flagyl, Change to PO and complete 10 day course (through 2/25)    - Stable from ID standpoint      D/W pt and wife at  bedside.    Belinda Madrigal  [phone]

## 2018-02-20 NOTE — PROGRESS NOTE ADULT - PROBLEM SELECTOR PROBLEM 6
Anemia of renal disease

## 2018-02-20 NOTE — DISCHARGE NOTE ADULT - PATIENT PORTAL LINK FT
You can access the [facility] Patient Portal, offered by [facility], by registering with the following website: [email or web address]

## 2018-02-20 NOTE — DISCHARGE NOTE ADULT - MEDICATION SUMMARY - MEDICATIONS TO TAKE
I will START or STAY ON the medications listed below when I get home from the hospital:    metroNIDAZOLE 500 mg oral tablet  -- 1 tab(s) by mouth every 8 hours  -- Indication: For Colitits    methadone 10 mg oral tablet  -- 1 tab(s) by mouth 5 times a day  -- Indication: For pain control    Percocet 10/325 oral tablet  -- 1 tab(s) by mouth every 6 hours  -- Indication: For pain control    aspirin 81 mg oral tablet  -- 1 tab(s) by mouth once a day  -- Indication: For Coronary artery disease     citalopram 40 mg oral tablet  -- 1 tab(s) by mouth once a day  -- Indication: For depression    allopurinol 100 mg oral tablet  -- 1 tab(s) by mouth 2 times a day  -- Indication: For gout    atorvastatin 20 mg oral tablet  -- 1 tab(s) by mouth once a day  -- Indication: For High cholesterol    gemfibrozil 600 mg oral tablet  -- 0.5 tab(s) by mouth 2 times a day (before meals)  -- Indication: For High cholesterol    carvedilol 12.5 mg oral tablet  -- 1 tab(s) by mouth 2 times a day  -- Indication: For High blood pressure    ipratropium-albuterol 20 mcg-100 mcg/inh inhalation aerosol  -- 1 puff(s) inhaled 2 times a day  -- Indication: For COPD    Lasix 40 mg oral tablet  -- 1 tab(s) by mouth once a day  -- Indication: For Edema, lower extremity    dicyclomine 20 mg oral tablet  -- 1 tab(s) by mouth 2 times a day (before meals)  -- Indication: For Colitis    docusate sodium 100 mg oral tablet  -- 1 tab(s) by mouth 2 times a day  -- Indication: For Constipation    simethicone 80 mg oral tablet, chewable  -- 1 tab(s) by mouth 2 times a day  -- Indication: For Colitis    sevelamer hydrochloride 800 mg oral tablet  -- 1 tab(s) by mouth 3 times a day (with meals)  -- Indication: For CKD (chronic kidney disease)    pantoprazole 40 mg oral delayed release tablet  -- 1 tab(s) by mouth 2 times a day  -- Indication: For Acid reflux    ciprofloxacin 250 mg oral tablet  -- 1 tab(s) by mouth 2 times a day  -- Indication: For Colitits    folic acid 0.4 mg oral tablet  -- 1 tab(s) by mouth once a day  -- Indication: For Supplement    Vitamin D3 1000 intl units oral tablet  -- 2 tab(s) by mouth once a day  -- Indication: For Supplement    calcitriol 0.5 mcg oral capsule  -- 1 cap(s) by mouth once a day  -- Indication: For Supplement

## 2018-02-20 NOTE — PROGRESS NOTE ADULT - PROBLEM SELECTOR PLAN 6
Hb stable with low TSAT s/p venofer 200 mg IV X 1 dose on 2/15. Will need DAVID therapy as outpatient. Monitor Hb.

## 2018-02-20 NOTE — DISCHARGE NOTE ADULT - CARE PLAN
Principal Discharge DX:	Chest pain  Secondary Diagnosis:	Hypertension  Secondary Diagnosis:	Hyperlipidemia  Secondary Diagnosis:	CKD (chronic kidney disease), stage IV Principal Discharge DX:	Chest pain  Goal:	Follow up with cardiology for further workup  Assessment and plan of treatment:	You had an abnormal stress test, please follow up with Dr. Serra for further cardiac testing, an appointment was made for you on 3/1 at 3:30pm. Continue all medications as prescribed.  Secondary Diagnosis:	Hypertension  Assessment and plan of treatment:	Continue antihypertensive medications as prescribed. Follow up with your primary care physician for further monitoring in 1-2 weeks. Please call to arrange appointment.  Secondary Diagnosis:	Hyperlipidemia  Assessment and plan of treatment:	Continue lipid lowering drugs as prescribed. Low cholesterol diet. Follow up with your primary care physician for further monitoring in 1-2 weeks. Please call to arrange appointment.  Secondary Diagnosis:	CKD (chronic kidney disease), stage IV  Assessment and plan of treatment:	Take lasix 40mg once a day, hold metolazone. Please follow up with Dr. Wood, contact information provided below, please call to make an appointment.  Secondary Diagnosis:	Colitis  Goal:	Complete course of antibiotics.  Assessment and plan of treatment:	Please follow up with GI.

## 2018-02-20 NOTE — PROGRESS NOTE ADULT - PROBLEM SELECTOR PLAN 5
iPTH elevated. Ok to continue with calcitriol and cholecalciferol but change phoslo to renagel 800 mg TID with meals given high normal calcium. Monitor serum calcium and phosphorus.
iPTH elevated. Ok to continue with calcitriol and cholecalciferol and renagel 800 mg TID with meals. Monitor serum calcium and phosphorus.

## 2018-02-20 NOTE — PROGRESS NOTE ADULT - PROBLEM SELECTOR PROBLEM 2
CKD (chronic kidney disease), stage IV

## 2018-02-20 NOTE — DISCHARGE NOTE ADULT - HOSPITAL COURSE
68 y/o M with h/o HTN, HLD, CKD stage IV, CAD s/p 1 stent, AAA repair, COPD presents to the ED for chest pain X 1 day. Pt states he has had left sided constant chest pain with no radiation, nonexertional, nonpleuritic. Pt states the chest pain has currently resolved. Pt also states he suffers from chronic back pain since 1971 for years now. Pt denies LOC, syncope, fever, chills, palpitations shortness of breath, N/V/D/C, tingling, dysuria, urinary/bowel incontinence or any other complaints at this time.     Labs:  H/H 9.7/28.9  BUN/Cr 131/2.67  CEX 2: negative  CXR: no acute pulm disease  EKG shows NSR with PVC T wave flattening in V1, I @ 60 bpm  ms   Echo: Technically very difficult study. The LV is seen in limitedviews, and exclusively from the apical perspective.  1. Endocardium not well visualized; grossly normal left ventricular systolic function. Endocardial visualization enhanced with intravenous injection of echo contrast  (Definity). EF 55-60%.  2. Unable to comment on right ventricular or valvular function.  RUQ Sono: Distended gallbladder with gallstones. Consider a HIDA scan for further evaluation. Nonobstructing right kidney stone.  Occult Positive  Stress Test: Myocardial Perfusion SPECT results are abnormal.  * There are medium sized, mild to moderate defects in  inferior and inferoseptal walls that are partially  reversible, suggestive of mild ischemia with partial  scarring.    2/15/18 > AXR c/o gas pain - 5 mm radiopaque density overlying the lower pole of right kidney may be a nonobstructing renal calculus. Correlate with pending ultrasound of the  abdomen. Nonobstructive bowel gas pattern.  2/15 CT ABD - Colitis involving transverse colon.  2/16 HIDA - Normal hepatobiliary scan. No radionuclide evidence of acute cholecystitis.  BCx neg to date  UCx Neg to date      + Chest pain with atypical features - hx of cad/pci   stress test with mild inferior ischemia with overall preserved EF  echo revealing grossly normal lv sys fx  continue with low dose ASA   holding off cardiac testing until colitis resolved  plan for outpt f.u for further cardiac testing     + CKD   renal f.u  creat improving     + Colitis on CT: abd pain resolved still with diarrhea with meals, now on PO abx  ID recs: Leukocytosis in the setting of abdominal pain.  Abd US with gallstones and distention.  Pt with h/o of cholelithiasis in the past.  No fever or elevated LFTs. HIDA scan negative for cholecystitis. CTAP shows colitis involving transverse colon.  Cdiff negative.  Stool cultures negative.  Cont cipro/flagyl, Change to PO and complete 10 day course (through 2/25).      Discussed case with cardiology and GI, pt cleared for discharge with outpatient follow up.

## 2018-02-20 NOTE — DISCHARGE NOTE ADULT - MEDICATION SUMMARY - MEDICATIONS TO STOP TAKING
I will STOP taking the medications listed below when I get home from the hospital:    calcium acetate 667 mg oral tablet  -- 1 tab(s) by mouth 3 times a day    losartan 25 mg oral tablet  -- 1 tab(s) by mouth once a day    Lyrica 75 mg oral capsule  -- 1 cap(s) by mouth once a day    magnesium oxide 400 mg (241.3 mg elemental magnesium) oral tablet  -- 1 tab(s) by mouth 2 times a day    potassium chloride 8 mEq (600 mg) oral capsule, extended release  -- 1 cap(s) by mouth once a day

## 2018-02-20 NOTE — PROGRESS NOTE ADULT - ASSESSMENT
66 y/o M with h/o HTN, HLD, CKD stage IV, CAD s/p 1 stent, AAA repair, COPD admitted with chest pain    1. Chest pain with atypical features   hx of cad/pci   stress test with mild inferior ischemia with overall preserved EF  echo revealing grossly normal lv sys fx  continue with low dose ASA   holding off cardiac testing until colitis resolved  plan for outpt f.u for further cardiac testing     2. CKD   renal f.u  creat improving     3. Colitis  abx PO   mgmt per GI   abd pain resolved  still with diarrhea with meals  abx  change to po on d/c    d/c planning today once cleared by GI    dvt ppx     hopeful d/c david 66 y/o M with h/o HTN, HLD, CKD stage IV, CAD s/p 1 stent, AAA repair, COPD admitted with chest pain    1. Chest pain with atypical features   hx of cad/pci   stress test with mild inferior ischemia with overall preserved EF  echo revealing grossly normal lv sys fx  continue with low dose ASA   holding off cardiac testing until colitis resolved  plan for outpt f.u for further cardiac testing     2. CKD   renal f.u  creat improving     3. Colitis  abx PO   mgmt per GI   abd pain resolved  still with diarrhea with meals  abx  change to po on d/c    d/c planning today once cleared by GI    dvt ppx 68 y/o M with h/o HTN, HLD, CKD stage IV, CAD s/p 1 stent, AAA repair, COPD admitted with chest pain    1. Chest pain with atypical features   hx of cad/pci   stress test with mild inferior ischemia with overall preserved EF  echo revealing grossly normal lv sys fx  continue with low dose ASA   holding off cardiac testing until colitis resolved  plan for outpt f.u for further cardiac testing     2. CKD   renal f.u  creat improving     3. Colitis   on abx PO   mgmt per GI   abd pain resolved  still with diarrhea with meals      d/c planning today once cleared by GI    dvt ppx

## 2018-02-20 NOTE — PROGRESS NOTE ADULT - PROBLEM SELECTOR PLAN 3
BP improved. Holding losartan given MATT. Can decrease coreg if BP is low. Monitor BP.
BP low. Holding losartan given MATT. Can decrease coreg if BP remains low. Monitor BP.
BP improved. Holding losartan given resolving MATT. Can decrease coreg if BP is low. Monitor BP.
BP controlled. Will discontinue losartan given MATT. Monitor BP.

## 2018-02-20 NOTE — PROGRESS NOTE ADULT - PROBLEM SELECTOR PLAN 4
Patient appears dry. Holding lasix and metolazone.  Monitor UO.
Patient appears dry. Holding lasix and metolazone.  Monitor UO.
Patient appears dry. Holding lasix and metolazone. Gentle IVF as above. Monitor UO.
Patient appears dry. Holding lasix and metolazone. Gentle IVF as above. Monitor UO.
If patient discharged today, would restart lasix at reduced frequency (40 mg daily) and continue holding metolazone and potassium supplementation. Monitor UO.
Patient appears dry. Will hold lasix and metolazone given MATT and severe azotemia. Monitor UO.

## 2018-02-20 NOTE — DISCHARGE NOTE ADULT - CARE PROVIDERS DIRECT ADDRESSES
,DirectAddress_Unknown,[email or web address],DirectAddress_Unknown,DirectAddress_Unknown

## 2018-02-20 NOTE — PROGRESS NOTE ADULT - ATTENDING COMMENTS
[facility] NEPHROLOGY  Nahid Rosen M.D.  Gianni Wood D.O.  Karen Ceja M.D.  Jacinda Rubin, MSN, ANP-C    Telephone: [phone]  Facsimile: [phone]    [address] [place] [address]
Agree with above NP note.  colitis, still symptomatic  iv abx  gi/sx f/u  cv stable  no further cardiac w/u until colitis resolved
[facility] NEPHROLOGY  Nahid Rosen M.D.  Gianni Wood D.O.  Karen Ceja M.D.  Jacinda Rubin, MSN, ANP-C    Telephone: [phone]  Facsimile: [phone]    [address] [place] [address]
Patient seen and examined.  Agree with above NP note.  cv stable  abd pain improved  ct with colitis   iv abx  gi/sx f/u appreciated  hida negative  no chest pain, sob  will defer further cardiac testing for now in light of active colitis
agree with the above assessment and plan by DION Lou.  Cv stable  Still w diarrhea  DC when cleared by GI  Outpt followup for abnl stress
[facility] NEPHROLOGY  Nahid Rosen M.D.  Gianni Wood D.O.  Karen Ceja M.D.  Jacinda Rubin, MSN, ANP-C    Telephone: [phone]  Facsimile: [phone]    [address] [place] [address]
Agree with above NP note.  cv stable  stress test with mild inferior ischemia with overall preserved EF  continue with low dose ASA   will hold off further cardiac testing until gi work up complete  abd pain, leukocytosis, diarrhea  gi/sx eval   r/o choley  r/o colitis  abd per id  f/u cultures  trend ce's
[facility] NEPHROLOGY  Nahid Rosen M.D.  Gianni Wood D.O.  Karen Ceja M.D.  Jacinda Rubin, MSN, ANP-C    Telephone: [phone]  Facsimile: [phone]    [address] [place] [address]
[facility] NEPHROLOGY  Nahid Rosen M.D.  Gianni Wood D.O.  Karen Ceja M.D.  Jacinda Rubin, MSN, ANP-C    Telephone: [phone]  Facsimile: [phone]    [address] [place] [address]
[facility] NEPHROLOGY  Nahid Rosen M.D.  Gianni Wood D.O.  Karen Ceja M.D.  Jacinda Rubin, MSN, ANP-C    Telephone: [phone]  Facsimile: [phone]    [address] [place] [address]

## 2018-02-21 LAB
GI PCR PANEL, STOOL: SIGNIFICANT CHANGE UP
SPECIMEN SOURCE: SIGNIFICANT CHANGE UP

## 2018-07-30 NOTE — PROGRESS NOTE ADULT - PROBLEM/PLAN-2
DISPLAY PLAN FREE TEXT
yes